# Patient Record
Sex: FEMALE | Race: WHITE | NOT HISPANIC OR LATINO | Employment: FULL TIME | URBAN - METROPOLITAN AREA
[De-identification: names, ages, dates, MRNs, and addresses within clinical notes are randomized per-mention and may not be internally consistent; named-entity substitution may affect disease eponyms.]

---

## 2017-07-27 PROBLEM — R87.612 LGSIL ON PAP SMEAR OF CERVIX: Status: ACTIVE | Noted: 2017-07-27

## 2017-07-27 PROBLEM — A74.9 CHLAMYDIA: Status: ACTIVE | Noted: 2017-07-27

## 2017-08-04 ENCOUNTER — HOSPITAL ENCOUNTER (EMERGENCY)
Facility: HOSPITAL | Age: 22
Discharge: HOME | End: 2017-08-04
Attending: EMERGENCY MEDICINE
Payer: MEDICAID

## 2017-08-04 VITALS
RESPIRATION RATE: 16 BRPM | DIASTOLIC BLOOD PRESSURE: 74 MMHG | HEIGHT: 63 IN | BODY MASS INDEX: 23.04 KG/M2 | WEIGHT: 130 LBS | TEMPERATURE: 98 F | SYSTOLIC BLOOD PRESSURE: 121 MMHG | HEART RATE: 80 BPM | OXYGEN SATURATION: 100 %

## 2017-08-04 DIAGNOSIS — R07.9 CHEST PAIN: ICD-10-CM

## 2017-08-04 DIAGNOSIS — O26.892 PELVIC PAIN AFFECTING PREGNANCY, SECOND TRIMESTER: ICD-10-CM

## 2017-08-04 DIAGNOSIS — R10.9 ABDOMINAL PAIN AFFECTING PREGNANCY: Primary | ICD-10-CM

## 2017-08-04 DIAGNOSIS — O26.892 BACK PAIN AFFECTING PREGNANCY, SECOND TRIMESTER: ICD-10-CM

## 2017-08-04 DIAGNOSIS — O26.899 ABDOMINAL PAIN AFFECTING PREGNANCY: Primary | ICD-10-CM

## 2017-08-04 LAB
ALBUMIN SERPL BCP-MCNC: 3.4 G/DL
ALP SERPL-CCNC: 48 U/L
ALT SERPL W/O P-5'-P-CCNC: 7 U/L
AMORPH CRY URNS QL MICRO: NORMAL
ANION GAP SERPL CALC-SCNC: 7 MMOL/L
AST SERPL-CCNC: 10 U/L
BACTERIA #/AREA URNS HPF: NORMAL /HPF
BACTERIA GENITAL QL WET PREP: ABNORMAL
BASOPHILS # BLD AUTO: 0.01 K/UL
BASOPHILS NFR BLD: 0.2 %
BILIRUB SERPL-MCNC: 0.4 MG/DL
BILIRUB UR QL STRIP: NEGATIVE
BUN SERPL-MCNC: 8 MG/DL
CALCIUM SERPL-MCNC: 9.4 MG/DL
CHLORIDE SERPL-SCNC: 106 MMOL/L
CLARITY UR: CLEAR
CLUE CELLS VAG QL WET PREP: ABNORMAL
CO2 SERPL-SCNC: 26 MMOL/L
COLOR UR: YELLOW
CREAT SERPL-MCNC: 0.6 MG/DL
DIFFERENTIAL METHOD: ABNORMAL
EOSINOPHIL # BLD AUTO: 0.1 K/UL
EOSINOPHIL NFR BLD: 1.6 %
ERYTHROCYTE [DISTWIDTH] IN BLOOD BY AUTOMATED COUNT: 13.4 %
EST. GFR  (AFRICAN AMERICAN): >60 ML/MIN/1.73 M^2
EST. GFR  (NON AFRICAN AMERICAN): >60 ML/MIN/1.73 M^2
FILAMENT FUNGI VAG WET PREP-#/AREA: ABNORMAL
GLUCOSE SERPL-MCNC: 88 MG/DL
GLUCOSE UR QL STRIP: NEGATIVE
HCG INTACT+B SERPL-ACNC: NORMAL MIU/ML
HCT VFR BLD AUTO: 34.7 %
HGB BLD-MCNC: 12.1 G/DL
HGB UR QL STRIP: ABNORMAL
KETONES UR QL STRIP: NEGATIVE
LEUKOCYTE ESTERASE UR QL STRIP: NEGATIVE
LYMPHOCYTES # BLD AUTO: 1.6 K/UL
LYMPHOCYTES NFR BLD: 25.4 %
MCH RBC QN AUTO: 28.8 PG
MCHC RBC AUTO-ENTMCNC: 34.9 G/DL
MCV RBC AUTO: 83 FL
MICROSCOPIC COMMENT: NORMAL
MONOCYTES # BLD AUTO: 0.3 K/UL
MONOCYTES NFR BLD: 5.3 %
NEUTROPHILS # BLD AUTO: 4.3 K/UL
NEUTROPHILS NFR BLD: 67.5 %
NITRITE UR QL STRIP: NEGATIVE
PH UR STRIP: 6 [PH] (ref 5–8)
PLATELET # BLD AUTO: 208 K/UL
PMV BLD AUTO: 10.5 FL
POTASSIUM SERPL-SCNC: 4.3 MMOL/L
PROT SERPL-MCNC: 6.9 G/DL
PROT UR QL STRIP: NEGATIVE
RBC # BLD AUTO: 4.2 M/UL
RBC #/AREA URNS HPF: 2 /HPF (ref 0–4)
SODIUM SERPL-SCNC: 139 MMOL/L
SP GR UR STRIP: 1.02 (ref 1–1.03)
SPECIMEN SOURCE: ABNORMAL
SQUAMOUS #/AREA URNS HPF: 4 /HPF
T VAGINALIS GENITAL QL WET PREP: ABNORMAL
URN SPEC COLLECT METH UR: ABNORMAL
UROBILINOGEN UR STRIP-ACNC: NEGATIVE EU/DL
WBC # BLD AUTO: 6.41 K/UL
WBC #/AREA URNS HPF: 1 /HPF (ref 0–5)
WBC #/AREA VAG WET PREP: ABNORMAL
YEAST GENITAL QL WET PREP: ABNORMAL

## 2017-08-04 PROCEDURE — 85025 COMPLETE CBC W/AUTO DIFF WBC: CPT

## 2017-08-04 PROCEDURE — 81000 URINALYSIS NONAUTO W/SCOPE: CPT

## 2017-08-04 PROCEDURE — 80053 COMPREHEN METABOLIC PANEL: CPT

## 2017-08-04 PROCEDURE — 25000003 PHARM REV CODE 250: Performed by: PHYSICIAN ASSISTANT

## 2017-08-04 PROCEDURE — 87210 SMEAR WET MOUNT SALINE/INK: CPT

## 2017-08-04 PROCEDURE — 84702 CHORIONIC GONADOTROPIN TEST: CPT

## 2017-08-04 PROCEDURE — 87591 N.GONORRHOEAE DNA AMP PROB: CPT

## 2017-08-04 PROCEDURE — 93005 ELECTROCARDIOGRAM TRACING: CPT

## 2017-08-04 PROCEDURE — 99284 EMERGENCY DEPT VISIT MOD MDM: CPT

## 2017-08-04 RX ORDER — ACETAMINOPHEN 500 MG
500 TABLET ORAL
Status: COMPLETED | OUTPATIENT
Start: 2017-08-04 | End: 2017-08-04

## 2017-08-04 RX ADMIN — ACETAMINOPHEN 500 MG: 500 TABLET ORAL at 01:08

## 2017-08-04 NOTE — DISCHARGE INSTRUCTIONS
You can take over-the-counter Tylenol as needed for your pain.  Your abdominal pain could possibly be normal due to ligament stretching, a stomach virus, or early appendicitis. Anytime there is abdominal pain, pelvic pain or vaginal bleeding in pregnancy, miscarriage is also a possibility.    Please make appointment to follow up with your OBGYN in 2 days.     Return to ER if your pain moves to the right lower part of your abdomen, if you develop worsening symptoms, loss of appetite, fever, vaginal bleeding, worsening pain or as needed.

## 2017-08-04 NOTE — ED PROVIDER NOTES
"Encounter Date: 2017    SCRIBE #1 NOTE: I, Elbayasmani Avila, am scribing for, and in the presence of,  Rosaura Harrison PA-C. I have scribed the following portions of the note - Other sections scribed: HPI and ROS.       History     Chief Complaint   Patient presents with    Abdominal Pain     17 week gestation.  "labor pains since this am." denies trauma or bleeding.  all previous (x3) have been full term.     CC: Abdominal Pain    HPI: This A0 approximately 17 weeks pregnant 21 y/o femalepresents to the ED c/o constant generalized abdominal pain, pelvic pain and lumbar back pain that began this morning. Pt describes the symptoms as "really bad labor pains", noting the pain as constant and severe (10/10). She reports that she had one episode of diarrhea (1x episode last night). Pt is also c/o 3 episodes of intermittent CP, SOB, dizziness and lightheadedness since onset of pain. Pt reports that she was last evaluated by her OBGYN, Dr. Roberto Salamanca, where she had an ultrasound preformed and informed by the ultrasound tech that the "fetus was smaller than it should be". She denies vaginal bleeding, nausea, emesis, dysuria, fever, anorexia, leg swelling or pain and any recent sick contacts. No other associated symptoms. No treatment attempted PTA to the ED. No alleviating factors or exacerbating factors.          The history is provided by the patient. No  was used.     Review of patient's allergies indicates:   Allergen Reactions    Iodine and iodide containing products Hives    Latex, natural rubber Rash     Past Medical History:   Diagnosis Date    Asthma     Depression     Pt has Hx of depression and self mutilation (cutting)    Disorder of kidney and ureter      History reviewed. No pertinent surgical history.  Family History   Problem Relation Age of Onset    Lupus Mother      Social History   Substance Use Topics    Smoking status: Never Smoker    Smokeless tobacco: Not on file "    Alcohol use No      Comment: not currently. socially     Review of Systems   Constitutional: Negative for appetite change and fever.   HENT: Negative for sore throat.    Respiratory: Positive for shortness of breath.    Cardiovascular: Positive for chest pain. Negative for leg swelling.   Gastrointestinal: Positive for abdominal pain (generalized; radiating to the back) and diarrhea (1x episode last night). Negative for nausea and vomiting.   Genitourinary: Positive for frequency and pelvic pain. Negative for dysuria and vaginal bleeding.   Musculoskeletal: Positive for back pain (generalized).   Skin: Negative for rash.   Neurological: Positive for dizziness and light-headedness. Negative for weakness.       Physical Exam     Initial Vitals [08/04/17 1233]   BP Pulse Resp Temp SpO2   121/74 80 16 97.6 °F (36.4 °C) 100 %      MAP       89.67         Physical Exam    Nursing note and vitals reviewed.  Constitutional: She appears well-developed and well-nourished. No distress.   HENT:   Head: Normocephalic.   Right Ear: Hearing, tympanic membrane, external ear and ear canal normal.   Left Ear: Hearing, tympanic membrane, external ear and ear canal normal.   Nose: Nose normal. No mucosal edema. Right sinus exhibits no maxillary sinus tenderness and no frontal sinus tenderness. Left sinus exhibits no maxillary sinus tenderness and no frontal sinus tenderness.   Mouth/Throat: Uvula is midline, oropharynx is clear and moist and mucous membranes are normal. No oropharyngeal exudate, posterior oropharyngeal edema or posterior oropharyngeal erythema.   Eyes: Conjunctivae are normal.   Cardiovascular: Normal rate and regular rhythm. Exam reveals no gallop and no friction rub.    No murmur heard.  Pulmonary/Chest: Effort normal and breath sounds normal. She has no decreased breath sounds. She has no wheezes. She has no rhonchi. She has no rales. She exhibits no tenderness.   Abdominal: Soft. Bowel sounds are normal. She  "exhibits no distension. There is generalized tenderness. There is no rigidity, no rebound, no guarding, no CVA tenderness, no tenderness at McBurney's point and negative Ledesma's sign.   Genitourinary: Guaiac stool: thick white vaginal discharge. Guaiac stool: thick white vaginal discharge. Vaginal discharge found.   Genitourinary Comments: Moderate amount of thick white discharge. No bleeding. Right adnexal TTP or adnexal masses. No CMT. Cervical os closed.   Musculoskeletal: Normal range of motion.   No TTP of spine or paraspinal musculature.    Lymphadenopathy:     She has no cervical adenopathy.   Neurological: She is alert. She has normal strength. No cranial nerve deficit or sensory deficit.   Skin: Skin is warm and dry. Capillary refill takes less than 2 seconds.   Psychiatric: She has a normal mood and affect.         ED Course   Procedures  Labs Reviewed   CBC W/ AUTO DIFFERENTIAL - Abnormal; Notable for the following:        Result Value    Hematocrit 34.7 (*)     All other components within normal limits   COMPREHENSIVE METABOLIC PANEL - Abnormal; Notable for the following:     Albumin 3.4 (*)     Alkaline Phosphatase 48 (*)     ALT 7 (*)     Anion Gap 7 (*)     All other components within normal limits   VAGINAL SCREEN - Abnormal; Notable for the following:     Clue Cells, Wet Prep Few (*)     WBC - Vaginal Screen Few (*)     Bacteria - Vaginal Screen Few (*)     All other components within normal limits   URINALYSIS - Abnormal; Notable for the following:     Occult Blood UA 1+ (*)     All other components within normal limits   C. TRACHOMATIS/N. GONORRHOEAE BY AMP DNA   HCG, QUANTITATIVE, PREGNANCY   URINALYSIS MICROSCOPIC             Medical Decision Making:   Initial Assessment:   Patient is a  22-year-old approximately 17 weeks gravid (based on ultrasound) female presents for evaluation of constant denies abdominal pain, pelvic pain and lower back pain that "feels like labor pain."  since this " morning.  Patient denies vaginal bleeding, loss of appetite, fevers, NV.  Patient is afebrile, well-appearing in acute distress.  On exam, there is generalized tenderness to palpation of the abdomen.  No CVA tenderness or TTP to the back. . Pelvic exam remarkable for moderate amount of thick white discharge and right adnexal TTP.  No CMT. No bleeding. Cervical os closed. Patient was recently treated for chlamydia by OB/GYN and has been reports he was also treated and they have not had sexual intercourse.  Patient given Tylenol in the ED.  Repeat abdominal exam with TTP to the lower abdomen with no peritoneal signs.  CBC and CMP unremarkable.  HCG 19,000.  Vaginal screen remarkable for clue cells.  UA without evidence of infection-doubt UTI or pyelonephritis.  Patient discharged home with instructions to take Tylenol as needed for pain, pelvic rest. This may be second trimester changes, miscarriage, early appendcitis.  OB/GYN follow-up in 2 days.  Strict ER return precautions discussed with the patient including worsening pain, pain with less of RLQ, anorexia, fever, nausea, vomiting or as needed.  I discussed this patient with Dr. Medina who also evaluated the pt face to face and she agrees with assessment and plan.             Scribe Attestation:   Scribe #1: I performed the above scribed service and the documentation accurately describes the services I performed. I attest to the accuracy of the note.    Attending Attestation:           Physician Attestation for Scribe:  Physician Attestation Statement for Scribe #1: I, Rosaura Harrison PA-C, reviewed documentation, as scribed by Elba Avila in my presence, and it is both accurate and complete.                 ED Course     Clinical Impression:   The primary encounter diagnosis was Abdominal pain affecting pregnancy. Diagnoses of Chest pain, Back pain affecting pregnancy, second trimester, and Pelvic pain affecting pregnancy, second trimester were also  pertinent to this visit.                           Rosaura Harrison PA-C  08/05/17 0155

## 2017-08-04 NOTE — ED TRIAGE NOTES
Patient comes to the ER with  Abdominal discomfort. Patient states pain radiates to back. Patient had diarrhea this morning. OBGYN is Dr. Salamanca. Patient states feels like labor pains. Patient is 18 weeks pregnant.

## 2017-08-06 LAB
C TRACH DNA SPEC QL NAA+PROBE: DETECTED
N GONORRHOEA DNA SPEC QL NAA+PROBE: NOT DETECTED

## 2017-10-17 ENCOUNTER — HOSPITAL ENCOUNTER (OUTPATIENT)
Dept: OBSTETRICS AND GYNECOLOGY | Facility: HOSPITAL | Age: 22
Discharge: HOME | End: 2017-10-17
Attending: OBSTETRICS & GYNECOLOGY
Payer: MEDICAID

## 2017-10-17 DIAGNOSIS — Z34.90 PREGNANCY: ICD-10-CM

## 2017-10-17 LAB — INJECT RH IG VOL PATIENT: NORMAL ML

## 2017-10-17 PROCEDURE — 63600519 RHOGAM PHARM REV CODE 636 ALT 250 W HCPCS: Performed by: OBSTETRICS & GYNECOLOGY

## 2017-10-17 PROCEDURE — 96372 THER/PROPH/DIAG INJ SC/IM: CPT

## 2017-10-17 RX ADMIN — HUMAN RHO(D) IMMUNE GLOBULIN 300 MCG: 300 INJECTION, SOLUTION INTRAMUSCULAR at 03:10

## 2017-12-13 PROBLEM — O99.820 GBS (GROUP B STREPTOCOCCUS CARRIER), +RV CULTURE, CURRENTLY PREGNANT: Status: ACTIVE | Noted: 2017-12-13

## 2017-12-27 ENCOUNTER — HOSPITAL ENCOUNTER (OUTPATIENT)
Facility: HOSPITAL | Age: 22
Discharge: HOME | End: 2017-12-27
Attending: OBSTETRICS & GYNECOLOGY | Admitting: OBSTETRICS & GYNECOLOGY
Payer: MEDICAID

## 2017-12-27 VITALS
TEMPERATURE: 98 F | HEART RATE: 67 BPM | SYSTOLIC BLOOD PRESSURE: 107 MMHG | BODY MASS INDEX: 28 KG/M2 | HEIGHT: 63 IN | DIASTOLIC BLOOD PRESSURE: 60 MMHG | RESPIRATION RATE: 16 BRPM | WEIGHT: 158 LBS

## 2017-12-27 DIAGNOSIS — Z34.90 PREGNANCY WITH ONE FETUS: ICD-10-CM

## 2017-12-27 PROCEDURE — 99211 OFF/OP EST MAY X REQ PHY/QHP: CPT

## 2017-12-27 RX ORDER — ACETAMINOPHEN 500 MG
500 TABLET ORAL EVERY 6 HOURS PRN
Status: DISCONTINUED | OUTPATIENT
Start: 2017-12-27 | End: 2017-12-28 | Stop reason: HOSPADM

## 2017-12-27 RX ORDER — ONDANSETRON 8 MG/1
8 TABLET, ORALLY DISINTEGRATING ORAL EVERY 8 HOURS PRN
Status: DISCONTINUED | OUTPATIENT
Start: 2017-12-27 | End: 2017-12-28 | Stop reason: HOSPADM

## 2017-12-28 NOTE — DISCHARGE INSTRUCTIONS
Home Undelivered Discharge Instructions    After Discharge Orders:    Future Appointments  Date Time Provider Department Center   1/2/2018 10:40 AM Goyo Pat MD [facility]           Current Discharge Medication List      CONTINUE these medications which have NOT CHANGED    Details   ibuprofen (ADVIL,MOTRIN) 600 MG tablet Take 1 tablet (600 mg total) by mouth every 6 (six) hours.  Qty: 30 tablet, Refills: 0      norgestimate-ethinyl estradiol (ORTHO TRI-CYCLEN,TRI-SPRINTEC) 0.18/0.215/0.25 mg-35 mcg (28) tablet Take 1 tablet by mouth once daily.  Qty: 28 tablet, Refills: 11      oxycodone-acetaminophen (PERCOCET) 5-325 mg per tablet Take 2 tablets by mouth every 4 (four) hours as needed.  Qty: 20 tablet, Refills: 0      PNV62-FA-om3-dha-epa-fish oil (PRENATAL GUMMY) 400 mcg-35 mg -25 mg-5 mg Chew Take by mouth.                     · Diet:  normal diet as tolerated    · Rest: normal activity as tolerated    Other instructions: Do kick counts once a day on your baby. Choose the time of day your baby is most active. Get in a comfortable lying or sitting position and time how long it takes to feel 10 kicks, twists, turns, swishes, or rolls. Call your physician or midwife if there have not been 10 kicks in 1 hours    Call physician or midwife, return to Labor and Delivery, call 911, or go to the nearest Emergency Room if: increased leakage or fluid, contractions more than  6 per  15 minutes, decreased fetal movement, persistent low back pain or cramping, bleeding from vaginal area, persistent headache or vision change

## 2017-12-28 NOTE — TREATMENT PLAN
"Pt arrived with c/o loosing her mucus plug and having some ctx;states she was seen in the office yesterday and was told she was 3-4 cm and she could "go anytime";denies vag bleeding and leaking of fluid;abd soft and nontender to palp;+fm noted;poc discussed; at bedside  "

## 2017-12-31 ENCOUNTER — HOSPITAL ENCOUNTER (OUTPATIENT)
Facility: HOSPITAL | Age: 22
Discharge: HOME | End: 2017-12-31
Attending: OBSTETRICS & GYNECOLOGY | Admitting: OBSTETRICS & GYNECOLOGY
Payer: MEDICAID

## 2017-12-31 VITALS — SYSTOLIC BLOOD PRESSURE: 112 MMHG | HEART RATE: 81 BPM | DIASTOLIC BLOOD PRESSURE: 72 MMHG

## 2017-12-31 PROCEDURE — 99211 OFF/OP EST MAY X REQ PHY/QHP: CPT

## 2017-12-31 NOTE — DISCHARGE INSTRUCTIONS
Home Undelivered Discharge Instructions    After Discharge Orders:    Future Appointments  Date Time Provider Department Center   1/2/2018 10:40 AM Goyo Pat MD [facility]       Call physician office with any questions.    Current Discharge Medication List      CONTINUE these medications which have NOT CHANGED    Details   ibuprofen (ADVIL,MOTRIN) 600 MG tablet Take 1 tablet (600 mg total) by mouth every 6 (six) hours.  Qty: 30 tablet, Refills: 0      norgestimate-ethinyl estradiol (ORTHO TRI-CYCLEN,TRI-SPRINTEC) 0.18/0.215/0.25 mg-35 mcg (28) tablet Take 1 tablet by mouth once daily.  Qty: 28 tablet, Refills: 11      oxycodone-acetaminophen (PERCOCET) 5-325 mg per tablet Take 2 tablets by mouth every 4 (four) hours as needed.  Qty: 20 tablet, Refills: 0      PNV62-FA-om3-dha-epa-fish oil (PRENATAL GUMMY) 400 mcg-35 mg -25 mg-5 mg Chew Take by mouth.                     · Diet:  normal diet as tolerated    · Rest: normal activity as tolerated    Other instructions: Do kick counts once a day on your baby. Choose the time of day your baby is most active. Get in a comfortable lying or sitting position and time how long it takes to feel 10 kicks, twists, turns, swishes, or rolls. Call your physician or midwife if there have not been 10 kicks in 1 hours    Call physician or midwife, return to Labor and Delivery, call 911, or go to the nearest Emergency Room if: increased leakage or fluid, contractions more than  5 per  15 minutes, decreased fetal movement, persistent low back pain or cramping, bleeding from vaginal area or difficulty urinating

## 2018-01-02 ENCOUNTER — ANESTHESIA EVENT (OUTPATIENT)
Dept: OBSTETRICS AND GYNECOLOGY | Facility: HOSPITAL | Age: 23
End: 2018-01-02
Payer: MEDICAID

## 2018-01-02 ENCOUNTER — ANESTHESIA (OUTPATIENT)
Dept: OBSTETRICS AND GYNECOLOGY | Facility: HOSPITAL | Age: 23
End: 2018-01-02
Payer: MEDICAID

## 2018-01-02 ENCOUNTER — HOSPITAL ENCOUNTER (INPATIENT)
Facility: HOSPITAL | Age: 23
LOS: 2 days | Discharge: HOME | End: 2018-01-04
Attending: OBSTETRICS & GYNECOLOGY | Admitting: OBSTETRICS & GYNECOLOGY
Payer: MEDICAID

## 2018-01-02 DIAGNOSIS — Z34.03 ENCOUNTER FOR SUPERVISION OF NORMAL FIRST PREGNANCY IN THIRD TRIMESTER: ICD-10-CM

## 2018-01-02 LAB
ABO + RH BLD: NORMAL
BASOPHILS # BLD AUTO: 0.01 K/UL
BASOPHILS NFR BLD: 0.1 %
BLD GP AB SCN CELLS X3 SERPL QL: NORMAL
DIFFERENTIAL METHOD: ABNORMAL
EOSINOPHIL # BLD AUTO: 0.1 K/UL
EOSINOPHIL NFR BLD: 1.4 %
ERYTHROCYTE [DISTWIDTH] IN BLOOD BY AUTOMATED COUNT: 12.9 %
HCT VFR BLD AUTO: 35.9 %
HGB BLD-MCNC: 11.8 G/DL
LYMPHOCYTES # BLD AUTO: 1.2 K/UL
LYMPHOCYTES NFR BLD: 15.1 %
MCH RBC QN AUTO: 27.1 PG
MCHC RBC AUTO-ENTMCNC: 32.9 G/DL
MCV RBC AUTO: 82 FL
MONOCYTES # BLD AUTO: 0.6 K/UL
MONOCYTES NFR BLD: 8.1 %
NEUTROPHILS # BLD AUTO: 5.8 K/UL
NEUTROPHILS NFR BLD: 74.5 %
PLATELET # BLD AUTO: 202 K/UL
PMV BLD AUTO: 11 FL
RBC # BLD AUTO: 4.36 M/UL
WBC # BLD AUTO: 7.76 K/UL

## 2018-01-02 PROCEDURE — 85025 COMPLETE CBC W/AUTO DIFF WBC: CPT

## 2018-01-02 PROCEDURE — 27200710 HC EPIDURAL INFUSION PUMP SET: Performed by: ANESTHESIOLOGY

## 2018-01-02 PROCEDURE — 63600175 PHARM REV CODE 636 W HCPCS: Performed by: OBSTETRICS & GYNECOLOGY

## 2018-01-02 PROCEDURE — 86592 SYPHILIS TEST NON-TREP QUAL: CPT

## 2018-01-02 PROCEDURE — 25000003 PHARM REV CODE 250: Performed by: OBSTETRICS & GYNECOLOGY

## 2018-01-02 PROCEDURE — 36415 COLL VENOUS BLD VENIPUNCTURE: CPT

## 2018-01-02 PROCEDURE — 27800517 HC TRAY,EPIDURAL-CONTINUOUS: Performed by: ANESTHESIOLOGY

## 2018-01-02 PROCEDURE — 85461 HEMOGLOBIN FETAL: CPT

## 2018-01-02 PROCEDURE — 86850 RBC ANTIBODY SCREEN: CPT

## 2018-01-02 PROCEDURE — 72200005 HC VAGINAL DELIVERY LEVEL II

## 2018-01-02 PROCEDURE — 59409 OBSTETRICAL CARE: CPT | Mod: AA,,, | Performed by: ANESTHESIOLOGY

## 2018-01-02 PROCEDURE — 99211 OFF/OP EST MAY X REQ PHY/QHP: CPT | Mod: 25

## 2018-01-02 PROCEDURE — 11000001 HC ACUTE MED/SURG PRIVATE ROOM

## 2018-01-02 PROCEDURE — 62326 NJX INTERLAMINAR LMBR/SAC: CPT | Performed by: ANESTHESIOLOGY

## 2018-01-02 PROCEDURE — 25000003 PHARM REV CODE 250: Performed by: ANESTHESIOLOGY

## 2018-01-02 PROCEDURE — 51702 INSERT TEMP BLADDER CATH: CPT

## 2018-01-02 PROCEDURE — 72100002 HC LABOR CARE, 1ST 8 HOURS

## 2018-01-02 RX ORDER — HYDROCODONE BITARTRATE AND ACETAMINOPHEN 10; 325 MG/1; MG/1
1 TABLET ORAL EVERY 4 HOURS PRN
Status: DISCONTINUED | OUTPATIENT
Start: 2018-01-02 | End: 2018-01-04 | Stop reason: HOSPADM

## 2018-01-02 RX ORDER — ACETAMINOPHEN 325 MG/1
650 TABLET ORAL EVERY 6 HOURS PRN
Status: DISCONTINUED | OUTPATIENT
Start: 2018-01-02 | End: 2018-01-04 | Stop reason: HOSPADM

## 2018-01-02 RX ORDER — OXYTOCIN/RINGER'S LACTATE 20/1000 ML
2 PLASTIC BAG, INJECTION (ML) INTRAVENOUS CONTINUOUS
Status: DISCONTINUED | OUTPATIENT
Start: 2018-01-02 | End: 2018-01-02

## 2018-01-02 RX ORDER — ONDANSETRON 8 MG/1
8 TABLET, ORALLY DISINTEGRATING ORAL EVERY 8 HOURS PRN
Status: DISCONTINUED | OUTPATIENT
Start: 2018-01-02 | End: 2018-01-02

## 2018-01-02 RX ORDER — BUPIVACAINE HYDROCHLORIDE 2.5 MG/ML
INJECTION, SOLUTION EPIDURAL; INFILTRATION; INTRACAUDAL
Status: DISCONTINUED | OUTPATIENT
Start: 2018-01-02 | End: 2018-01-05

## 2018-01-02 RX ORDER — IBUPROFEN 600 MG/1
600 TABLET ORAL EVERY 6 HOURS
Status: DISCONTINUED | OUTPATIENT
Start: 2018-01-02 | End: 2018-01-04 | Stop reason: HOSPADM

## 2018-01-02 RX ORDER — HYDROCODONE BITARTRATE AND ACETAMINOPHEN 5; 325 MG/1; MG/1
1 TABLET ORAL EVERY 4 HOURS PRN
Status: DISCONTINUED | OUTPATIENT
Start: 2018-01-02 | End: 2018-01-04 | Stop reason: HOSPADM

## 2018-01-02 RX ORDER — SODIUM CHLORIDE, SODIUM LACTATE, POTASSIUM CHLORIDE, CALCIUM CHLORIDE 600; 310; 30; 20 MG/100ML; MG/100ML; MG/100ML; MG/100ML
INJECTION, SOLUTION INTRAVENOUS CONTINUOUS
Status: DISCONTINUED | OUTPATIENT
Start: 2018-01-02 | End: 2018-01-02

## 2018-01-02 RX ORDER — FENTANYL/BUPIVACAINE/NS/PF 2MCG/ML-.1
PLASTIC BAG, INJECTION (ML) INJECTION CONTINUOUS PRN
Status: DISCONTINUED | OUTPATIENT
Start: 2018-01-02 | End: 2018-01-05

## 2018-01-02 RX ORDER — SODIUM CHLORIDE 9 MG/ML
INJECTION, SOLUTION INTRAVENOUS
Status: DISCONTINUED | OUTPATIENT
Start: 2018-01-02 | End: 2018-01-02

## 2018-01-02 RX ORDER — OXYTOCIN/RINGER'S LACTATE 20/1000 ML
41.65 PLASTIC BAG, INJECTION (ML) INTRAVENOUS CONTINUOUS
Status: DISPENSED | OUTPATIENT
Start: 2018-01-02 | End: 2018-01-03

## 2018-01-02 RX ORDER — HYDROCORTISONE 25 MG/G
CREAM TOPICAL 3 TIMES DAILY PRN
Status: DISCONTINUED | OUTPATIENT
Start: 2018-01-02 | End: 2018-01-04 | Stop reason: HOSPADM

## 2018-01-02 RX ORDER — DIPHENHYDRAMINE HYDROCHLORIDE 50 MG/ML
25 INJECTION INTRAMUSCULAR; INTRAVENOUS EVERY 4 HOURS PRN
Status: DISCONTINUED | OUTPATIENT
Start: 2018-01-02 | End: 2018-01-04 | Stop reason: HOSPADM

## 2018-01-02 RX ORDER — MISOPROSTOL 200 UG/1
600 TABLET ORAL
Status: DISCONTINUED | OUTPATIENT
Start: 2018-01-02 | End: 2018-01-02

## 2018-01-02 RX ADMIN — Medication 2 MILLI-UNITS/MIN: at 02:01

## 2018-01-02 RX ADMIN — AMPICILLIN SODIUM 2 G: 2 INJECTION, POWDER, FOR SOLUTION INTRAMUSCULAR; INTRAVENOUS at 01:01

## 2018-01-02 RX ADMIN — SODIUM CHLORIDE, SODIUM LACTATE, POTASSIUM CHLORIDE, AND CALCIUM CHLORIDE: 600; 310; 30; 20 INJECTION, SOLUTION INTRAVENOUS at 01:01

## 2018-01-02 RX ADMIN — BUPIVACAINE HYDROCHLORIDE 0.8 ML: 2.5 INJECTION, SOLUTION EPIDURAL; INFILTRATION; INTRACAUDAL; PERINEURAL at 02:01

## 2018-01-02 RX ADMIN — IBUPROFEN 600 MG: 600 TABLET, FILM COATED ORAL at 11:01

## 2018-01-02 RX ADMIN — Medication 10 ML/HR: at 02:01

## 2018-01-02 RX ADMIN — Medication 41.65 MILLI-UNITS/MIN: at 05:01

## 2018-01-02 RX ADMIN — HYDROCODONE BITARTRATE AND ACETAMINOPHEN 1 TABLET: 5; 325 TABLET ORAL at 09:01

## 2018-01-02 RX ADMIN — SODIUM CHLORIDE, SODIUM LACTATE, POTASSIUM CHLORIDE, AND CALCIUM CHLORIDE: 600; 310; 30; 20 INJECTION, SOLUTION INTRAVENOUS at 03:01

## 2018-01-02 NOTE — LACTATION NOTE
This note was copied from a baby's chart.     18 0995   Infant Assessment   Sucking Reflex present   Rooting Reflex present   Swallow Reflex present   LATCH Score   Latch 2-->grasps breast, tongue down, lips flanged, rhythmic sucking   Audible Swallowing 2-->spontaneous and intermittent (24 hrs old)   Type Of Nipple 2-->everted (after stimulation)   Comfort (Breast/Nipple) 2-->soft/nontender   Hold (Positioning) 2-->no assist from staff, mother able to position/hold infant   Score (less than 7 for 2/more consecutive times, consult Lactation Consultant) 10   Maternal Infant Feeding   Maternal Emotional State independent;relaxed   Infant Positioning cradle   Signs of Milk Transfer audible swallow;infant jaw motion present   Presence of Pain no   Time Spent (min) 0-15 min   Latch Assistance no   Breastfeeding Education adequate infant intake;adequate milk volume;importance of skin-to-skin contact   Breastfeeding History   Currently Breastfeeding yes   Infant First Feeding   Skin-to-Skin Contact Maintained   Breastfeeding breastfeeding, left side only   Feeding Infant   Feeding Readiness Cues rooting   Feeding Tolerance/Success rooting;strong suck;coordinated suck;coordinated swallow   Effective Latch During Feeding yes   Suck/Swallow Coordination present   Lactation Referrals   Lactation Consult Breastfeeding assessment;Initial assessment    Breastfeeding   Sleep States ([place] [employer] [place]) active alert (eyes open/moderate activity/brief vocalizing)   Lactation Interventions   Attachment Promotion counseling provided;family involvement promoted;infant-mother separation minimized;privacy provided;role responsibility promoted;skin-to-skin contact encouraged;rooming-in promoted   Breastfeeding Assistance feeding cue recognition promoted;feeding on demand promoted;feeding session observed;infant latch-on verified;infant suck/swallow verified;support offered   Maternal Breastfeeding Support encouragement  offered;infant-mother separation minimized;lactation counseling provided   mother with baby skin to skin and at the breast -baby with strong sucking on and off and mother denies discomfort with feeding -states having  last baby for 3 months -review some basic breastfeeding information now and states understanding

## 2018-01-02 NOTE — ANESTHESIA PREPROCEDURE EVALUATION
01/02/2018  Rosaura Boyd is a 22 y.o., female.    Anesthesia Evaluation    I have reviewed the Patient Summary Reports.     I have reviewed the Medications.     Review of Systems  Anesthesia Hx:  No problems with previous Anesthesia Denies Hx of Anesthetic complications  History of prior surgery of interest to airway management or planning: Denies Family Hx of Anesthesia complications.   Denies Personal Hx of Anesthesia complications.   Social:  Non-Smoker, No Alcohol Use    Hematology/Oncology:  Hematology Normal   Oncology Normal     EENT/Dental:EENT/Dental Normal   Cardiovascular:  Cardiovascular Normal Exercise tolerance: good     Pulmonary:   Asthma mild    Renal/:   Chronic Renal Disease    Hepatic/GI:  Hepatic/GI Normal    Musculoskeletal:  Musculoskeletal Normal    Neurological:  Neurology Normal    Endocrine:  Endocrine Normal    Dermatological:  Skin Normal    Psych:   Psychiatric History          Physical Exam  General:  Well nourished    Airway/Jaw/Neck:  Airway Findings: Mouth Opening: Normal Tongue: Normal  General Airway Assessment: Adult  Mallampati: II  Improves to II with phonation.  TM Distance: 4 - 6 cm      Dental:  Dental Findings: In tact   Chest/Lungs:  Chest/Lungs Findings: Clear to auscultation, Normal Respiratory Rate     Heart/Vascular:  Heart Findings: Rate: Normal  Rhythm: Regular Rhythm        Mental Status:  Mental Status Findings:  Cooperative, Alert and Oriented         Anesthesia Plan  Type of Anesthesia, risks & benefits discussed:  Anesthesia Type:  CSE  Patient's Preference:   Intra-op Monitoring Plan:   Intra-op Monitoring Plan Comments:   Post Op Pain Control Plan:   Post Op Pain Control Plan Comments:   Induction:   IV  Beta Blocker:  Patient is not currently on a Beta-Blocker (No further documentation required).       Informed Consent: Patient understands risks  and agrees with Anesthesia plan.  Questions answered. Anesthesia consent signed with patient.  ASA Score: 2     Day of Surgery Review of History & Physical:    H&P update referred to the surgeon.         Ready For Surgery From Anesthesia Perspective.

## 2018-01-02 NOTE — H&P
`History and Physical  Obstetrics      SUBJECTIVE:     Rosaura Boyd is a 22 y.o.  female at 39w1d  admitted for labor management.  Her current obstetrical history is significant for preciptous deliveries .        (Not in a hospital admission)    Review of patient's allergies indicates:   Allergen Reactions    Iodine and iodide containing products Hives    Latex, natural rubber Rash        Past Medical History:   Diagnosis Date    Asthma     Depression     Pt has Hx of depression and self mutilation (cutting)    Disorder of kidney and ureter      No past surgical history on file.  Family History   Problem Relation Age of Onset    Lupus Mother      Social History   Substance Use Topics    Smoking status: Never Smoker    Smokeless tobacco: Never Used    Alcohol use No      Comment: not currently. socially        OBJECTIVE:     Vital Signs (Most Recent)  BP: 109/70 (18 1027)    Physical Exam:  General:  Normal, alert and oriented                       Abdomen: Soft gravid no FT   Uterine Size:  c/w dates   Presentations:  vertex   FHT: reviewed   Pelvis: NEFG   Cervix:     Dilation: 4 cm    Effacement: 75%    Station:  -2               Laboratory:  @LABRCNTIP(ABORH,HEPBSAG,RUBELLAIGGSC,GBS,AFP,RHJTBWO2FR)@   ASSESSMENT/PLAN:     39w1d gestation.  Active phase labor.   Conditions: GBBS Colonization- start PCN.

## 2018-01-02 NOTE — TREATMENT PLAN
"Pt presents to unit with c/o contractions. Pt states she was sent from the office by Dr. Pat due to her cervix being 4 cm dilated and that he "stripped her membranes". EFMx2 placed, positive FHT noted. SVE done, see charting. History and POC reviewed with pt, verbalizes understanding. PO hydration given to pt.   "

## 2018-01-02 NOTE — L&D DELIVERY NOTE
Surgeon kalyan    Date     Procedure:      Indications: 22 y.o.  with IUP at 39w1d admitted in labor.  Patient progressed to C/+2.    Findings:  female infant, vertex presentation, APGARS 9 at 1 minute, 9 at 5 minutes, weight is pending.    Procedure  Laceration :  No  Episiotomy:  No  Forceps: No  Vacuum: No    EBL: 300cc    Anesthesia epidural    Specimen:  Placenta sent No    Complications:  None    Patient was placed in dorsal lithotomy position.  Patient was prepped and draped in a sterile fashion.  Patient began to push.  The vertex crowned and delivered.  The mouth and nose were suctioned with the bulb.  The remaining infant delivered without difficulty. The cord was cut and clamped.  The infant was vigorous upon delivery.  The infant was handed to the nurse.  The placenta delivered spontaneously and intact.  The patient tolerated the procedure well.  Patient will be monitored in recovery.

## 2018-01-02 NOTE — ANESTHESIA PROCEDURE NOTES
CSE    Patient location during procedure: OB  Start time: 1/2/2018 1:57 PM  Timeout: 1/2/2018 1:57 PM  End time: 1/2/2018 2:03 PM  Staffing  Anesthesiologist: BERNABE BENDER  Performed: anesthesiologist   Preanesthetic Checklist  Completed: patient identified, site marked, surgical consent, pre-op evaluation, timeout performed, IV checked, risks and benefits discussed and monitors and equipment checked  CSE  Patient position: sitting  Prep: ChloraPrep and site prepped and draped  Patient monitoring: heart rate, continuous pulse ox and frequent blood pressure checks  Approach: midline  Spinal Needle  Needle type: pencil-tip   Needle gauge: 25 G  Needle length: 5 in  Epidural Needle  Injection technique: MARIA GUADALUPE air  Needle type: Tuohy   Needle gauge: 17 G  Needle length: 3.5 in  Needle insertion depth: 5 cm  Location: L4-5  Needle localization: anatomical landmarks  Catheter  Catheter type: Coherent Labs  Catheter size: 19 G  Catheter at skin depth: 10 cm  Test dose: lidocaine 1.5% with Epi 1-to-200,000  Additional Documentation: negative aspiration for CSF, negative aspiration for heme and negative test dose  Assessment  Intrathecal Medications:  Bolus administered: 0.8 mL of 0.25 bupivacaine

## 2018-01-02 NOTE — TREATMENT PLAN
Notified and spoke with Dr. Boston of pts SVE, ctx pattern, and FHT. Ordered to admit pt for labor and call MD when ready to deliver. Pt can get epidural when ready.

## 2018-01-03 LAB
ABO + RH BLD: NORMAL
BASOPHILS # BLD AUTO: 0.02 K/UL
BASOPHILS NFR BLD: 0.2 %
BLD GP AB SCN CELLS X3 SERPL QL: NORMAL
DIFFERENTIAL METHOD: ABNORMAL
EOSINOPHIL # BLD AUTO: 0.2 K/UL
EOSINOPHIL NFR BLD: 2.4 %
ERYTHROCYTE [DISTWIDTH] IN BLOOD BY AUTOMATED COUNT: 12.8 %
FETAL CELL SCN BLD QL ROSETTE: NORMAL
HCT VFR BLD AUTO: 32.3 %
HGB BLD-MCNC: 10.7 G/DL
INJECT RH IG VOL PATIENT: NORMAL ML
LYMPHOCYTES # BLD AUTO: 1.6 K/UL
LYMPHOCYTES NFR BLD: 19 %
MCH RBC QN AUTO: 27 PG
MCHC RBC AUTO-ENTMCNC: 33.1 G/DL
MCV RBC AUTO: 82 FL
MONOCYTES # BLD AUTO: 0.9 K/UL
MONOCYTES NFR BLD: 10.4 %
NEUTROPHILS # BLD AUTO: 5.6 K/UL
NEUTROPHILS NFR BLD: 68 %
PLATELET # BLD AUTO: 152 K/UL
PMV BLD AUTO: 11 FL
RBC # BLD AUTO: 3.96 M/UL
RPR SER QL: NORMAL
WBC # BLD AUTO: 8.2 K/UL

## 2018-01-03 PROCEDURE — 85025 COMPLETE CBC W/AUTO DIFF WBC: CPT

## 2018-01-03 PROCEDURE — 63600519 RHOGAM PHARM REV CODE 636 ALT 250 W HCPCS: Performed by: OBSTETRICS & GYNECOLOGY

## 2018-01-03 PROCEDURE — 25000003 PHARM REV CODE 250: Performed by: OBSTETRICS & GYNECOLOGY

## 2018-01-03 PROCEDURE — 11000001 HC ACUTE MED/SURG PRIVATE ROOM

## 2018-01-03 PROCEDURE — 85461 HEMOGLOBIN FETAL: CPT

## 2018-01-03 PROCEDURE — 86850 RBC ANTIBODY SCREEN: CPT

## 2018-01-03 PROCEDURE — 36415 COLL VENOUS BLD VENIPUNCTURE: CPT

## 2018-01-03 RX ADMIN — IBUPROFEN 600 MG: 600 TABLET, FILM COATED ORAL at 05:01

## 2018-01-03 RX ADMIN — HUMAN RHO(D) IMMUNE GLOBULIN 300 MCG: 300 INJECTION, SOLUTION INTRAMUSCULAR at 05:01

## 2018-01-03 RX ADMIN — HYDROCODONE BITARTRATE AND ACETAMINOPHEN 1 TABLET: 5; 325 TABLET ORAL at 09:01

## 2018-01-03 RX ADMIN — IBUPROFEN 600 MG: 600 TABLET, FILM COATED ORAL at 12:01

## 2018-01-03 NOTE — PROGRESS NOTES
Spoke with Peg in the lab who informed me that the rhogam workup needs to be reordered because there was a problem the last two attempts to order it.

## 2018-01-03 NOTE — PLAN OF CARE
Problem: Breastfeeding (Adult,Obstetrics,Pediatric)  Goal: Signs and Symptoms of Listed Potential Problems Will be Absent, Minimized or Managed (Breastfeeding)  Signs and symptoms of listed potential problems will be absent, minimized or managed by discharge/transition of care (reference Breastfeeding (Adult,Obstetrics,Pediatric) CPG).   Outcome: Ongoing (interventions implemented as appropriate)  Plan to breastfeed on demand at least 8 times in 24 hours. Mother states breastfeeding going well, denies nipple pain.

## 2018-01-03 NOTE — LACTATION NOTE
This note was copied from a baby's chart.     01/03/18 0730   LATCH Score   Latch 2-->grasps breast, tongue down, lips flanged, rhythmic sucking   Audible Swallowing 2-->spontaneous and intermittent (24 hrs old)   Type Of Nipple 2-->everted (after stimulation)   Comfort (Breast/Nipple) 2-->soft/nontender   Hold (Positioning) 2-->no assist from staff, mother able to position/hold infant   Score (less than 7 for 2/more consecutive times, consult Lactation Consultant) 10   Maternal Infant Feeding   Infant Positioning side-lying;cradle   Signs of Milk Transfer audible swallow;infant jaw motion present   Breastfeeding Education adequate infant intake;importance of skin-to-skin contact;adequate milk volume   Infant First Feeding   Breastfeeding breastfeeding, bilateral   Breastfeeding Left Side (min) 5 Min  (continues to nurse)   Breastfeeding Right Side (min) 13 Min   Feeding Infant   Feeding Tolerance/Success alert for feeding;adequate pause for breath;eager;strong suck;coordinated suck;coordinated swallow   Effective Latch During Feeding yes   Suck/Swallow Coordination present   Lactation Interventions   Breastfeeding Assistance feeding cue recognition promoted;feeding on demand promoted;feeding session observed;infant latch-on verified;infant suck/swallow verified;support offered   Maternal Breastfeeding Support diary/feeding log utilized;encouragement offered;infant-mother separation minimized;lactation counseling provided;maternal hydration promoted;maternal nutrition promoted;maternal rest encouraged   Mother breastfeeding comfortably in side lying cradle position on left breast. No complaints of nipple discomfort. Mother encouraged to call for lactation assistance as needed.

## 2018-01-03 NOTE — PLAN OF CARE
Problem: Patient Care Overview  Goal: Plan of Care Review  Outcome: Ongoing (interventions implemented as appropriate)  VSS,, breastfeeding on demand, encouraged 8 times in 24 hours, wearing supportive bra. Fundus and lochia WDL. Voiding, passing flatus, ambulating and tolerating regular diet well. Bonding well with baby, Pain being managed with motrin and Vicodin as needed. Stated an understanding to POC. AM labs to be drawn. Pt Rhogam candidate

## 2018-01-03 NOTE — PROGRESS NOTES
No complaints.  Bleeding and pain are controlled.     Vital Signs (Most Recent):  Temp: 96.2 °F (35.7 °C) (01/03/18 0801)  Pulse: 76 (01/03/18 0801)  Resp: 18 (01/03/18 0801)  BP: (!) 97/55 (01/03/18 0801)  SpO2: (!) 93 % (01/02/18 1912)    Vital Signs Range (Last 24H):  Temp:  [96.2 °F (35.7 °C)-98.8 °F (37.1 °C)]   Pulse:  []   Resp:  [18]   BP: ()/(52-71)   SpO2:  [88 %-100 %]     Gen - NAD  Uterus - firm, non-tender        Recent Labs  Lab 01/03/18  0721   WBC 8.20   HGB 10.7*   HCT 32.3*          A/P PPD #1  Routine care

## 2018-01-04 VITALS
RESPIRATION RATE: 17 BRPM | BODY MASS INDEX: 27.64 KG/M2 | WEIGHT: 156 LBS | TEMPERATURE: 99 F | SYSTOLIC BLOOD PRESSURE: 98 MMHG | HEIGHT: 63 IN | DIASTOLIC BLOOD PRESSURE: 54 MMHG | HEART RATE: 69 BPM | OXYGEN SATURATION: 93 %

## 2018-01-04 PROCEDURE — 25000003 PHARM REV CODE 250: Performed by: OBSTETRICS & GYNECOLOGY

## 2018-01-04 RX ORDER — IBUPROFEN 600 MG/1
600 TABLET ORAL EVERY 6 HOURS PRN
Qty: 60 TABLET | Refills: 2 | Status: SHIPPED | OUTPATIENT
Start: 2018-01-04 | End: 2018-03-12

## 2018-01-04 RX ADMIN — IBUPROFEN 600 MG: 600 TABLET, FILM COATED ORAL at 05:01

## 2018-01-04 RX ADMIN — IBUPROFEN 600 MG: 600 TABLET, FILM COATED ORAL at 12:01

## 2018-01-04 NOTE — PLAN OF CARE
Problem: Patient Care Overview  Goal: Plan of Care Review  Outcome: Outcome(s) achieved Date Met: 01/04/18  Tolerating regular diet.  Voiding and passing gas.  Verbalizes pain control with prn pain medication.  Verbalized understanding of plan of care and care of self after discharge.

## 2018-01-04 NOTE — NURSING
Patient lying in bed breastfeeding baby. Encouraged pt to call nurse for assessment when finished nursing, stated an understanding. Denied c/o or needs at this time. Call light within reach.

## 2018-01-04 NOTE — LACTATION NOTE
"This note was copied from a baby's chart.     01/04/18 1000   Maternal Infant Assessment   Breast Density Bilateral:;full   Areola Bilateral:;elastic   Nipple(s) Bilateral:;everted   Infant Assessment   Sucking Reflex present   Rooting Reflex present   Swallow Reflex present   LATCH Score   Latch 2-->grasps breast, tongue down, lips flanged, rhythmic sucking   Audible Swallowing 2-->spontaneous and intermittent (24 hrs old)   Type Of Nipple 2-->everted (after stimulation)   Comfort (Breast/Nipple) 1-->filling, red/small blisters/bruises, mild/mod discomfort   Hold (Positioning) 2-->no assist from staff, mother able to position/hold infant   Score (less than 7 for 2/more consecutive times, consult Lactation Consultant) 9   Maternal Infant Feeding   Maternal Emotional State relaxed;independent   Time Spent (min) 15-30 min   Infant First Feeding   Breastfeeding Left Side (min) 30 Min   Breastfeeding Right Side (min) 30 Min   Lactation Referrals   Lactation Consult Follow up;Knowledge deficit   Lactation Interventions   Maternal Breastfeeding Support encouragement offered;lactation counseling provided   Reports breastfeeding well without complications, breasts full, not hard.  Breastfeeding discharge instructions given with review of Mother's Breastfeeding Guide and Resource List.  Encouraged to call hotline # prn.  States "understand" and verbalized appropriate recall.    "

## 2018-01-04 NOTE — PLAN OF CARE
Problem: Patient Care Overview  Goal: Plan of Care Review  Outcome: Ongoing (interventions implemented as appropriate)  VSS,, breastfeeding on demand, encouraged 8 times in 24 hours, wearing supportive bra. Fundus and lochia WDL. Voiding, passing flatus, ambulating and tolerating regular diet well. Bonding well with baby, Pain being managed with motrin and Vicodin  as needed. Stated an understanding to POC. Patient anticipates discharge 1-4-18

## 2018-01-04 NOTE — PROGRESS NOTES
Patient is postpartum day 2.  Doing well, no complaints. Denies h/a, vision changes, upper abd pain, chest pain, sob.    Patient Active Problem List   Diagnosis    History of pyelonephritis    Rh negative status during pregnancy    Supervision of other normal pregnancy    LGSIL on Pap smear of cervix    GBS (group B Streptococcus carrier), +RV culture, currently pregnant    Pregnancy with one fetus    Encounter for supervision of normal first pregnancy in third trimester       Temp:  [96.2 °F (35.7 °C)-98.6 °F (37 °C)] 98.1 °F (36.7 °C)  Pulse:  [70-88] 74  Resp:  [18] 18  BP: ()/(50-57) 98/56  Alert and oriented  Lungs: Normal respiratory effort.  Cv: RRR  Abd soft fundus firm and nontender  Ext: No significant asymmetric edema, no calf tenderness.      Recent Labs  Lab 18  0721 18  1517   WBC 8.20  --    HGB 10.7*  --      --    GROUPTRH  --  O NEG       A&P  22 y.o.  Post partum, doing well  Routine postpartum care  Birth control: BTL interval

## 2018-01-04 NOTE — DISCHARGE INSTRUCTIONS
After vaginal delivery:    Regular diet  Drink 6-8 glasses of water a day  Shower only for next 6 weeks.  If perineum sore, may soak in a few inches of warm water in tub.  No lifting anything more than 10 pounds.  No driving for 1 week  Walking is the only exercise allowed for 6 weeks  No sexual intercourse, no tampons, no douching for 6 weeks  Discuss with your doctor your birth control preferences at next visit  Wear supportive bra    Notify doctor if you have:  -Fever of 101 or higher  -Persistent nausea and vomiting  -Heavy vaginal bleeding or clots  -Swelling and pain in arms or legs  -Severe headaches, blurred vision, or fainting  -Frequency and burning with urination  Breastfeeding discharge instructions given with First Alert form and reviewed.  Also discussed:   AAP recommendation of exclusive breastfeeding for the first 6 months of life and continued breastfeeding with the introduction of supplemental foods beyond the first year of life.  Instructed on the recommendation to delay all bottle and pacifier use until after 4 weeks of age and breastfeeding is well established.  Discussed the benefits of exclusive breastfeeding for both mother and baby.  Discussed the risks of supplementation/pacifier use on the exclusivity of breastfeeding in the first 6 months. Feed the baby at the earliest sign of hunger or comfort  o Hands to mouth, sucking motions  o Rooting or searching for something to suck on  o Dont wait for crying - it is a not a late sign of hunger; it is a sign of distress     The feedings may be 8-12 times per 24hrs and will not follow a schedule   Alternate the breast you start the feeding with, or start with the breast that feels the fullest   Switch breasts when the baby takes himself off the breast or falls asleep   Keep offering breasts until the baby looks full, no longer gives hunger signs, and stays asleep when placed on his back in the crib   If the baby is sleepy and wont wake for  a feeding, put the baby skin-to-skin dressed in a diaper against the mothers bare chest   Sleep near your baby   The baby should be positioned and latched on to the breast correctly  o Chest-to-chest, chin in the breast  o Babys lips are flipped outward  o Babys mouth is stretched open wide like a shout  o Babys sucking should feel like tugging to the mother  - The baby should be drinking at the breast:  o You should hear swallowing or gulping throughout the feeding  o You should see milk on the babys lips when he comes off the breast  o Your breasts should be softer when the baby is finished feeding  o The baby should look relaxed at the end of feedings  o After the 4th day and your milk is in:  o The babys poop should turn bright yellow and be loose, watery, and seedy  o The baby should have at least 3-4 poops the size of the palm of your hand per day  o The baby should have at least 6-8 wet diapers per day  o The urine should be light yellow in color  You should drink when you are thirsty and eat a healthy diet when you are    hungry.     Take naps to get the rest you need.   Take medications and/or drink alcohol only with permission of your obstetrician    or the babys pediatrician.  You can also call the [facility],   ([phone]), Monday-Friday, 8am-5pm Central time, to get the most   up-to-date evidence-based information on the use of medications during   pregnancy and breastfeeding.      The baby should be examined by a pediatrician at 3-5 days of age; unless ordered sooner by the pediatrician.  Once your milk comes in, the baby should be back to birth weight no later than 10-14 days of age.

## 2018-01-04 NOTE — DISCHARGE SUMMARY
22 y.o.   OB History      Para Term  AB Living    4 4 4     4    SAB TAB Ectopic Multiple Live Births          0 4        Obstetric Comments    LMP 12        admitted for Delivery. See procedure note. Postpartum course was unremarkable.     Admit date 2018 11:34 AM  D/c date 2018    Disposition: Home  Activity 6 weeks pelvic rest  Diet: normal  DischargeFollow up in 6 weeks if vaginal delivery  Meds listed separately

## 2018-01-05 NOTE — ANESTHESIA POSTPROCEDURE EVALUATION
"Anesthesia Post Evaluation    Patient: Rosaura Boyd    Procedure(s) Performed: * No procedures listed *    Final Anesthesia Type: CSE  Patient location during evaluation: labor & delivery  Patient participation: Yes- Able to Participate  Level of consciousness: awake and alert and oriented  Post-procedure vital signs: reviewed and stable  Pain management: adequate  Airway patency: patent  PONV status at discharge: No PONV  Anesthetic complications: no      Cardiovascular status: blood pressure returned to baseline and hemodynamically stable  Respiratory status: unassisted, spontaneous ventilation and room air  Hydration status: euvolemic  Follow-up not needed.        Visit Vitals  BP (!) 98/54   Pulse 69   Temp 36.9 °C (98.5 °F)   Resp 17   Ht 5' 3" (1.6 m)   Wt 70.8 kg (156 lb)   LMP 03/22/2017   SpO2 (!) 93%   Breastfeeding? Yes   BMI 27.63 kg/m²       Pain/Najma Score: Pain Assessment Performed: Yes (1/4/2018  6:30 AM)  Presence of Pain: non-verbal indicators present (1/4/2018  6:30 AM)  Pain Rating Prior to Med Admin: 0 (1/4/2018 12:00 PM)  Pain Rating Post Med Admin: 0 (facial score 0/10, asleep, NAD) (1/4/2018  6:30 AM)      "

## 2018-01-06 ENCOUNTER — TELEPHONE (OUTPATIENT)
Dept: OBSTETRICS AND GYNECOLOGY | Facility: HOSPITAL | Age: 23
End: 2018-01-06

## 2018-01-07 ENCOUNTER — TELEPHONE (OUTPATIENT)
Dept: OBSTETRICS AND GYNECOLOGY | Facility: HOSPITAL | Age: 23
End: 2018-01-07

## 2018-01-07 NOTE — TELEPHONE ENCOUNTER
"Spoke to JASPER who states '" she can't come to the phone' but baby is breastfeeding well and having many wet and dirty diapers -encourage to have mother call us with any concerns -states okay   "

## 2018-02-16 PROBLEM — R87.612 LGSIL ON PAP SMEAR OF CERVIX: Status: RESOLVED | Noted: 2017-07-27 | Resolved: 2018-02-16

## 2018-02-16 PROBLEM — O99.820 GBS (GROUP B STREPTOCOCCUS CARRIER), +RV CULTURE, CURRENTLY PREGNANT: Status: RESOLVED | Noted: 2017-12-13 | Resolved: 2018-02-16

## 2018-03-12 ENCOUNTER — HOSPITAL ENCOUNTER (OUTPATIENT)
Dept: PREADMISSION TESTING | Facility: HOSPITAL | Age: 23
Discharge: HOME | End: 2018-03-12
Attending: OBSTETRICS & GYNECOLOGY
Payer: MEDICAID

## 2018-03-12 VITALS
HEIGHT: 63 IN | WEIGHT: 148.81 LBS | RESPIRATION RATE: 17 BRPM | TEMPERATURE: 98 F | DIASTOLIC BLOOD PRESSURE: 66 MMHG | SYSTOLIC BLOOD PRESSURE: 99 MMHG | BODY MASS INDEX: 26.37 KG/M2 | OXYGEN SATURATION: 97 % | HEART RATE: 71 BPM

## 2018-03-12 DIAGNOSIS — Z01.818 PRE-OP TESTING: Primary | ICD-10-CM

## 2018-03-12 LAB
BASOPHILS # BLD AUTO: 0.01 K/UL
BASOPHILS NFR BLD: 0.2 %
DIFFERENTIAL METHOD: ABNORMAL
EOSINOPHIL # BLD AUTO: 0.3 K/UL
EOSINOPHIL NFR BLD: 4.5 %
ERYTHROCYTE [DISTWIDTH] IN BLOOD BY AUTOMATED COUNT: 14.2 %
HCT VFR BLD AUTO: 37.8 %
HGB BLD-MCNC: 12.4 G/DL
LYMPHOCYTES # BLD AUTO: 2 K/UL
LYMPHOCYTES NFR BLD: 34.7 %
MCH RBC QN AUTO: 26.4 PG
MCHC RBC AUTO-ENTMCNC: 32.8 G/DL
MCV RBC AUTO: 81 FL
MONOCYTES # BLD AUTO: 0.5 K/UL
MONOCYTES NFR BLD: 8.4 %
NEUTROPHILS # BLD AUTO: 3 K/UL
NEUTROPHILS NFR BLD: 52 %
PLATELET # BLD AUTO: 253 K/UL
PMV BLD AUTO: 10.5 FL
RBC # BLD AUTO: 4.69 M/UL
WBC # BLD AUTO: 5.73 K/UL

## 2018-03-12 PROCEDURE — 85025 COMPLETE CBC W/AUTO DIFF WBC: CPT

## 2018-03-12 PROCEDURE — 36415 COLL VENOUS BLD VENIPUNCTURE: CPT

## 2018-03-12 NOTE — DISCHARGE INSTRUCTIONS
Your surgery is scheduled for __Wednesday March 21, 2018__________________.    Call [phone] between 2 p.m. and 5 p.m. on   _Tuesday______________ to find out your arrival time for the day of your surgery.      Please report to SAME DAY SURGERY UNIT on the 2nd FLOOR at _______ a.m.  Use front door entrance. The doors open at 0530 am.        INSTRUCTIONS IMPORTANT!!!  ¨ Do not eat or drink after 12 midnight-including water. OK to brush teeth, no   gum, candy or mints!      _x___  Prep instructions:    SHOWER    _x___  Please shower using Hibiclens soap the night before AND  the morning of your surgery/procedure. Do not use Hibiclens on your face or genitals      x         If your surgery is around your belly button (Navel) be sure to wash inside your belly button also. Rinse hibiclens off completely.  _x___  No shaving of procedural area at least 4-5 days before surgery due to ncreased risk of skin irritation and/or possible infection.  _x___  Do not wear makeup, including mascara. WEARING EYE MAKEUP MAY LEAD TO SERIOUS EYE INJURY during surgery.  __x__  No powder, lotions or creams to your body.  __x__  You may wear only deodorant on the day of surgery.  __x__  Please remove all jewelry, including piercings and leave at home.  __x__  No money or valuables needed. Please leave at home.  You may bring your cell phone.  ____  Please bring any documents given by your doctor.  _x___  If going home the same day, arrange for a ride home. You will not be able to   drive if Anesthesia was used.  ____  Children under 18 years require a parent / guardian present the entire time   they are in surgery / recovery.  _x___  Wear loose fitting clothing. Allow for dressings, bandages.  _x___  Stop Aspirin, Ibuprofen, Motrin and Aleve at least 3-5 days before surgery, unless otherwise instructed by your doctor, or the nurse.              You MAY use Tylenol/acetaminophen until day of surgery.  ____  If you take diabetic medication,  do not take am of surgery unless instructed by   Doctor.  _x___  Call MD for temperature above 101 degrees.        __x__ Stop taking any Fish Oil supplement or any Vitamins that contain Vitamin E at least 5 days prior to surgery.          I have read or had read and explained to me, and understand the above information.  Additional comments or instructions:Please call   [phone] if you have any questions regarding the instructions above.

## 2018-03-12 NOTE — PRE ADMISSION SCREENING
Patient breast feeding baby.  Instructed before surgery to pump and save milk for 2 days feeding  post surgery.  Following surgery to pump and discard milk.  Expressed understanding.

## 2018-03-20 ENCOUNTER — ANESTHESIA EVENT (OUTPATIENT)
Dept: SURGERY | Facility: HOSPITAL | Age: 23
End: 2018-03-20
Payer: MEDICAID

## 2018-03-21 ENCOUNTER — HOSPITAL ENCOUNTER (OUTPATIENT)
Facility: HOSPITAL | Age: 23
Discharge: HOME | End: 2018-03-21
Attending: OBSTETRICS & GYNECOLOGY | Admitting: OBSTETRICS & GYNECOLOGY
Payer: MEDICAID

## 2018-03-21 ENCOUNTER — ANESTHESIA (OUTPATIENT)
Dept: SURGERY | Facility: HOSPITAL | Age: 23
End: 2018-03-21
Payer: MEDICAID

## 2018-03-21 VITALS
HEART RATE: 65 BPM | RESPIRATION RATE: 20 BRPM | WEIGHT: 148.81 LBS | OXYGEN SATURATION: 100 % | TEMPERATURE: 98 F | DIASTOLIC BLOOD PRESSURE: 67 MMHG | BODY MASS INDEX: 26.37 KG/M2 | HEIGHT: 63 IN | SYSTOLIC BLOOD PRESSURE: 114 MMHG

## 2018-03-21 DIAGNOSIS — Z30.09 FAMILY PLANNING: ICD-10-CM

## 2018-03-21 DIAGNOSIS — Z30.09 UNWANTED FERTILITY: Primary | ICD-10-CM

## 2018-03-21 LAB
ABO + RH BLD: NORMAL
B-HCG UR QL: NEGATIVE
BLD GP AB SCN CELLS X3 SERPL QL: NORMAL

## 2018-03-21 PROCEDURE — 37000008 HC ANESTHESIA 1ST 15 MINUTES: Performed by: OBSTETRICS & GYNECOLOGY

## 2018-03-21 PROCEDURE — 71000039 HC RECOVERY, EACH ADD'L HOUR: Performed by: OBSTETRICS & GYNECOLOGY

## 2018-03-21 PROCEDURE — 00851 ANES IPER PX TUBAL LIGATION: CPT | Performed by: OBSTETRICS & GYNECOLOGY

## 2018-03-21 PROCEDURE — 81025 URINE PREGNANCY TEST: CPT

## 2018-03-21 PROCEDURE — 36415 COLL VENOUS BLD VENIPUNCTURE: CPT

## 2018-03-21 PROCEDURE — 36000709 HC OR TIME LEV III EA ADD 15 MIN: Performed by: OBSTETRICS & GYNECOLOGY

## 2018-03-21 PROCEDURE — 25000003 PHARM REV CODE 250: Performed by: NURSE ANESTHETIST, CERTIFIED REGISTERED

## 2018-03-21 PROCEDURE — 88302 TISSUE EXAM BY PATHOLOGIST: CPT | Mod: 26,,, | Performed by: PATHOLOGY

## 2018-03-21 PROCEDURE — 63600175 PHARM REV CODE 636 W HCPCS: Performed by: NURSE ANESTHETIST, CERTIFIED REGISTERED

## 2018-03-21 PROCEDURE — 63600175 PHARM REV CODE 636 W HCPCS: Performed by: ANESTHESIOLOGY

## 2018-03-21 PROCEDURE — D9220A PRA ANESTHESIA: Mod: ANES,,, | Performed by: ANESTHESIOLOGY

## 2018-03-21 PROCEDURE — 36000708 HC OR TIME LEV III 1ST 15 MIN: Performed by: OBSTETRICS & GYNECOLOGY

## 2018-03-21 PROCEDURE — D9220A PRA ANESTHESIA: Mod: CRNA,,, | Performed by: NURSE ANESTHETIST, CERTIFIED REGISTERED

## 2018-03-21 PROCEDURE — 88302 TISSUE EXAM BY PATHOLOGIST: CPT | Performed by: PATHOLOGY

## 2018-03-21 PROCEDURE — 71000015 HC POSTOP RECOV 1ST HR: Performed by: OBSTETRICS & GYNECOLOGY

## 2018-03-21 PROCEDURE — 27201423 OPTIME MED/SURG SUP & DEVICES STERILE SUPPLY: Performed by: OBSTETRICS & GYNECOLOGY

## 2018-03-21 PROCEDURE — 37000009 HC ANESTHESIA EA ADD 15 MINS: Performed by: OBSTETRICS & GYNECOLOGY

## 2018-03-21 PROCEDURE — 86901 BLOOD TYPING SEROLOGIC RH(D): CPT

## 2018-03-21 PROCEDURE — 25000003 PHARM REV CODE 250: Performed by: ANESTHESIOLOGY

## 2018-03-21 PROCEDURE — 71000033 HC RECOVERY, INTIAL HOUR: Performed by: OBSTETRICS & GYNECOLOGY

## 2018-03-21 RX ORDER — FENTANYL CITRATE 50 UG/ML
25 INJECTION, SOLUTION INTRAMUSCULAR; INTRAVENOUS EVERY 5 MIN PRN
Status: COMPLETED | OUTPATIENT
Start: 2018-03-21 | End: 2018-03-21

## 2018-03-21 RX ORDER — DIPHENHYDRAMINE HYDROCHLORIDE 50 MG/ML
25 INJECTION INTRAMUSCULAR; INTRAVENOUS EVERY 4 HOURS PRN
Status: DISCONTINUED | OUTPATIENT
Start: 2018-03-21 | End: 2018-03-21 | Stop reason: HOSPADM

## 2018-03-21 RX ORDER — ROCURONIUM BROMIDE 10 MG/ML
INJECTION, SOLUTION INTRAVENOUS
Status: DISCONTINUED | OUTPATIENT
Start: 2018-03-21 | End: 2018-03-21

## 2018-03-21 RX ORDER — NEOSTIGMINE METHYLSULFATE 1 MG/ML
INJECTION, SOLUTION INTRAVENOUS
Status: DISCONTINUED | OUTPATIENT
Start: 2018-03-21 | End: 2018-03-21

## 2018-03-21 RX ORDER — HYDROCODONE BITARTRATE AND ACETAMINOPHEN 10; 325 MG/1; MG/1
1 TABLET ORAL EVERY 4 HOURS PRN
Status: DISCONTINUED | OUTPATIENT
Start: 2018-03-21 | End: 2018-03-21 | Stop reason: HOSPADM

## 2018-03-21 RX ORDER — DIPHENHYDRAMINE HCL 25 MG
25 CAPSULE ORAL EVERY 4 HOURS PRN
Status: DISCONTINUED | OUTPATIENT
Start: 2018-03-21 | End: 2018-03-21 | Stop reason: HOSPADM

## 2018-03-21 RX ORDER — MIDAZOLAM HYDROCHLORIDE 1 MG/ML
INJECTION, SOLUTION INTRAMUSCULAR; INTRAVENOUS
Status: DISCONTINUED | OUTPATIENT
Start: 2018-03-21 | End: 2018-03-21

## 2018-03-21 RX ORDER — HYDROCODONE BITARTRATE AND ACETAMINOPHEN 5; 325 MG/1; MG/1
1 TABLET ORAL EVERY 4 HOURS PRN
Status: DISCONTINUED | OUTPATIENT
Start: 2018-03-21 | End: 2018-03-21 | Stop reason: HOSPADM

## 2018-03-21 RX ORDER — ONDANSETRON 4 MG/1
8 TABLET, ORALLY DISINTEGRATING ORAL EVERY 8 HOURS PRN
Status: DISCONTINUED | OUTPATIENT
Start: 2018-03-21 | End: 2018-03-21 | Stop reason: HOSPADM

## 2018-03-21 RX ORDER — ONDANSETRON 2 MG/ML
INJECTION INTRAMUSCULAR; INTRAVENOUS
Status: DISCONTINUED | OUTPATIENT
Start: 2018-03-21 | End: 2018-03-21

## 2018-03-21 RX ORDER — SODIUM CHLORIDE, SODIUM LACTATE, POTASSIUM CHLORIDE, CALCIUM CHLORIDE 600; 310; 30; 20 MG/100ML; MG/100ML; MG/100ML; MG/100ML
INJECTION, SOLUTION INTRAVENOUS CONTINUOUS
Status: DISCONTINUED | OUTPATIENT
Start: 2018-03-21 | End: 2018-03-21 | Stop reason: HOSPADM

## 2018-03-21 RX ORDER — LIDOCAINE HCL/PF 100 MG/5ML
SYRINGE (ML) INTRAVENOUS
Status: DISCONTINUED | OUTPATIENT
Start: 2018-03-21 | End: 2018-03-21

## 2018-03-21 RX ORDER — IBUPROFEN 600 MG/1
600 TABLET ORAL EVERY 6 HOURS PRN
Status: DISCONTINUED | OUTPATIENT
Start: 2018-03-21 | End: 2018-03-21 | Stop reason: HOSPADM

## 2018-03-21 RX ORDER — PROPOFOL 10 MG/ML
VIAL (ML) INTRAVENOUS
Status: DISCONTINUED | OUTPATIENT
Start: 2018-03-21 | End: 2018-03-21

## 2018-03-21 RX ORDER — GLYCOPYRROLATE 0.2 MG/ML
INJECTION INTRAMUSCULAR; INTRAVENOUS
Status: DISCONTINUED | OUTPATIENT
Start: 2018-03-21 | End: 2018-03-21

## 2018-03-21 RX ORDER — SODIUM CHLORIDE 0.9 % (FLUSH) 0.9 %
3 SYRINGE (ML) INJECTION
Status: DISCONTINUED | OUTPATIENT
Start: 2018-03-21 | End: 2018-03-21 | Stop reason: HOSPADM

## 2018-03-21 RX ORDER — SODIUM CHLORIDE 9 MG/ML
20 INJECTION, SOLUTION INTRAVENOUS CONTINUOUS
Status: DISCONTINUED | OUTPATIENT
Start: 2018-03-21 | End: 2018-03-21 | Stop reason: HOSPADM

## 2018-03-21 RX ORDER — FENTANYL CITRATE 50 UG/ML
INJECTION, SOLUTION INTRAMUSCULAR; INTRAVENOUS
Status: DISCONTINUED | OUTPATIENT
Start: 2018-03-21 | End: 2018-03-21

## 2018-03-21 RX ORDER — LIDOCAINE HYDROCHLORIDE 10 MG/ML
1 INJECTION, SOLUTION EPIDURAL; INFILTRATION; INTRACAUDAL; PERINEURAL ONCE
Status: DISCONTINUED | OUTPATIENT
Start: 2018-03-21 | End: 2018-03-21 | Stop reason: HOSPADM

## 2018-03-21 RX ADMIN — FENTANYL CITRATE 25 MCG: 50 INJECTION, SOLUTION INTRAMUSCULAR; INTRAVENOUS at 09:03

## 2018-03-21 RX ADMIN — MIDAZOLAM HYDROCHLORIDE 2 MG: 1 INJECTION, SOLUTION INTRAMUSCULAR; INTRAVENOUS at 08:03

## 2018-03-21 RX ADMIN — SODIUM CHLORIDE, SODIUM LACTATE, POTASSIUM CHLORIDE, AND CALCIUM CHLORIDE 10 ML/HR: .6; .31; .03; .02 INJECTION, SOLUTION INTRAVENOUS at 07:03

## 2018-03-21 RX ADMIN — LIDOCAINE HYDROCHLORIDE 100 MG: 20 INJECTION, SOLUTION INTRAVENOUS at 08:03

## 2018-03-21 RX ADMIN — GLYCOPYRROLATE 0.2 MG: 0.2 INJECTION, SOLUTION INTRAMUSCULAR; INTRAVENOUS at 09:03

## 2018-03-21 RX ADMIN — NEOSTIGMINE METHYLSULFATE 2.5 MG: 1 INJECTION INTRAVENOUS at 09:03

## 2018-03-21 RX ADMIN — GLYCOPYRROLATE 0.2 MG: 0.2 INJECTION, SOLUTION INTRAMUSCULAR; INTRAVENOUS at 08:03

## 2018-03-21 RX ADMIN — ONDANSETRON 4 MG: 2 INJECTION, SOLUTION INTRAMUSCULAR; INTRAVENOUS at 08:03

## 2018-03-21 RX ADMIN — PROPOFOL 150 MG: 10 INJECTION, EMULSION INTRAVENOUS at 08:03

## 2018-03-21 RX ADMIN — ROCURONIUM BROMIDE 30 MG: 10 INJECTION, SOLUTION INTRAVENOUS at 08:03

## 2018-03-21 RX ADMIN — FENTANYL CITRATE 100 MCG: 50 INJECTION INTRAMUSCULAR; INTRAVENOUS at 08:03

## 2018-03-21 NOTE — INTERVAL H&P NOTE
The patient has been examined and the H&P has been reviewed:    I concur with the findings and no changes have occurred since H&P was written.    Anesthesia/Surgery risks, benefits and alternative options discussed and understood by patient/family.          Active Hospital Problems    Diagnosis  POA    Unwanted fertility [Z30.09]  Yes    Family planning [Z30.09]  Yes      Resolved Hospital Problems    Diagnosis Date Resolved POA   No resolved problems to display.

## 2018-03-21 NOTE — DISCHARGE SUMMARY
23 y.o. admitted for laparoscopic tubal ligation for UDF thru [facility].  Operative and postoperative course unremarkable.     D/c to home  Admit date 3/21/2018  7:19 AM  D/c date 03/21/2018  D/c dx LSC BTL  Procedures laparoscopy BTL        D/c diet as tolerated  D/c meds per med rec  D/c f/u one week  D/c instructions pelvic rest and avoid heavy lifting

## 2018-03-21 NOTE — OP NOTE
Preop Dx: Undesired fertility    Postop Dx: same    Findings: normal pelvis    Procedure: LSC BTL (full salpingectomies)    Surgeon: Goyo Pat MD    Complications: none    EBL: <100cc    Path: Bilateral tubal segments    Patient was prepped and draped in usual sterile fashion. Ponce was placed.   A uterine manipulator was placed in the uterus.  A small incision was made in the umbilicus. There was an initial low pressure.  Two additional trocars were placed in the lower quadrants under direct visualization.  The pelvis was inspected and noted to be normal.  Each fallopian tube was grasped and excised using cautery and scissors.  The  edges were recauterized as needed. The tubes were removed through the port. Good hemostasis was obtained.  All instruments were removed from the abdomen.  The abdomen was desufflated and skin incisions were closed dermabond (or other medical grade glue).  Sterile dressings were applied.  Sponge lap and needle counts were correct x 2.  Patient was taken to recovery in good condition.

## 2018-03-21 NOTE — DISCHARGE INSTRUCTIONS
BATHING:                   You may shower after your dressing is removed, but no tub baths, hot tubs, saunas or swimming until you see the doctor.    DRESSING:  ? Remove your bandage 24-48 hours.. If there are skin tapes over the incision, leave them in place. They will start to come off in 5-7 days.  ACTIVITY LEVEL: If you have received sedation or an anesthetic, you may feel sleepy for   several hours. Rest until you are more awake. Gradually resume your normal activities  ? No heavy lifting or straining, nothing over 10 lbs., like a gallon of milk.  ? Pelvic rest- no sex, tampons or douching until follow up or instructed by doctor.  DIET:  You may resume your home diet. If nausea is present, increase your diet gradually with fluids and bland foods.    Medications:  Pain medication should be taken only if needed and as directed. If antibiotics are prescribed, the medication should be taken until completed. You will be given an updated list of you medications.  ? No driving, alcoholic beverages or signing legal documents for next 24 hours or while taking pain medication    CALL THE DOCTOR:    For any obvious bleeding (some dried blood over the incision is normal).      Redness, swelling, foul smell around incision or fever over 101.   Shortness of breath, Coughing up Bloody Sputum or Pains or Swelling in your calves.   Persistent pain or nausea not relieved by medication.   If vaginal bleeding is in excess of a normal period.   Problems urinating    If any unusual problems or difficulties occur contact your doctor. If you cannot contact your doctor but feel your signs and symptoms warrant a physicians attention return to the emergency room.  Fall Prevention  Millions of people fall every year and injure themselves. You may have had anesthesia or sedation which may increase your risk of falling. You may have health issues that put you at an increased risk of falling.     Here are ways to reduce your risk of  falling.  ·   · Make your home safe by keeping walkways clear of objects you may trip over.  · Use non-slip pads under rugs. Do not use area rugs or small throw rugs.  · Use non-slip mats in bathtubs and showers.  · Install handrails and lights on staircases.  · Do not walk in poorly lit areas.  · Do not stand on chairs or wobbly ladders.  · Use caution when reaching overhead or looking upward. This position can cause a loss of balance.  · Be sure your shoes fit properly, have non-slip bottoms and are in good condition.   · Wear shoes both inside and out. Avoid going barefoot or wearing slippers.  · Be cautious when going up and down stairs, curbs, and when walking on uneven sidewalks.  · If your balance is poor, consider using a cane or walker.  · If your fall was related to alcohol use, stop or limit alcohol intake.   · If your fall was related to use of sleeping medicines, talk to your doctor about this. You may need to reduce your dosage at bedtime if you awaken during the night to go to the bathroom.    · To reduce the need for nighttime bathroom trips:  ¨ Avoid drinking fluids for several hours before going to bed  ¨ Empty your bladder before going to bed  ¨ Men can keep a urinal at the bedside  · Stay as active as you can. Balance, flexibility, strength, and endurance all come from exercise. They all play a role in preventing falls. Ask your healthcare provider which types of activity are right for you.  · Get your vision checked on a regular basis.  · If you have pets, know where they are before you stand up or walk so you don't trip over them.  Use night lights.DRESSING:  You have Dermabond covering your incision.  It is a clear liquid that dries like super glue.  Do not try to remove it.  It will flake off over time.  You may wash it with soap and water in the shower.  Do not apply any creams or lotion on it.    BATHING:    You may shower, but no tub baths until you see the doctor.    ACTIVITY LEVEL: If  you have received sedation or an anesthetic, you may feel sleepy for several hours. Rest until you are more awake. Gradually resume your normal activities. No driving or alcoholic beverages for 24 hours.     No driving, alcoholic beverages or signing legal documents for next 24 hours or while taking pain medication    DIET: You may resume your home diet. If nausea is present, increase your diet gradually with fluids and bland foods.    Medications: Pain medication should be taken only if needed and as directed. If antibiotics are prescribed, the medication should be taken until completed. You will be given an updated list of you medications.    CALL THE DOCTOR:   For any obvious bleeding (some dried blood over the incision is normal).                         Redness, swelling, foul smell around incision or fever over            101.                    Shortness of breath.  Persistent pain or nausea not relieved by medication.

## 2018-03-21 NOTE — H&P (VIEW-ONLY)
History and Physical  Obstetrics&Gynecology        SUBJECTIVE:   CC: undesired fertility  HPI: Rosaura Boyd is a 23 y.o.  female wanting a permanent tubal ligation for UDF. Declines LARCs and vasectomy and other options. Oligomenorrhea history.      (Not in a hospital admission)  Review of patient's allergies indicates:   Allergen Reactions    Iodine and iodide containing products Hives    Latex, natural rubber Rash      Past Medical History:   Diagnosis Date    Asthma     Depression     Pt has Hx of depression and self mutilation (cutting)    Disorder of kidney and ureter      No past surgical history on file.  Family History   Problem Relation Age of Onset    Lupus Mother      Social History   Substance Use Topics    Smoking status: Never Smoker    Smokeless tobacco: Never Used    Alcohol use No      Comment: not currently. socially       OBJECTIVE:      [unfilled]    Physical Exam:  Gen: Alert and Oriented x 3, NAD, Well nourished female  Neck: supple, No LAD  Cv: Regular rate  Lungs: Normal respiratory effort  Abd: Nondistended, tender but No Guarding/rebounding, No HSM  Pelvic:     Vulva: normal, no lesions    Uterus: normal size, mobile, NT    Cervix: Normal, no masses, No CMT    Vagina: No masses noted    Adnexa: No adnexal masses, NT    Urethra/bladder: no masses, nontender  Extremeties: Nt, no significant assymetric swelling      Labs:     ASSESSMENT/PLAN:      Ap: UDF- for LSC BTL full salpingectomy discussed. R/B/A discussed in detail.   LSIL pap- age 23. Colpo suggested.  Oligomenorrhea- prior to pregnancy. F/u labs

## 2018-03-22 PROBLEM — Z30.09 FAMILY PLANNING: Status: RESOLVED | Noted: 2018-03-21 | Resolved: 2018-03-22

## 2018-03-22 PROBLEM — Z34.90 PREGNANCY WITH ONE FETUS: Status: RESOLVED | Noted: 2017-12-27 | Resolved: 2018-03-22

## 2018-03-22 PROBLEM — Z98.51 H/O TUBAL LIGATION: Chronic | Status: ACTIVE | Noted: 2018-03-22

## 2018-03-22 PROBLEM — Z30.09 UNWANTED FERTILITY: Status: RESOLVED | Noted: 2018-03-21 | Resolved: 2018-03-22

## 2018-03-22 NOTE — ANESTHESIA POSTPROCEDURE EVALUATION
"Anesthesia Post Evaluation    Patient: Rosaura Bianchi    Procedure(s) Performed: Procedure(s) (LRB):  [id number] (N/A)    Final Anesthesia Type: general  Patient location during evaluation: PACU  Patient participation: Yes- Able to Participate  Level of consciousness: awake and alert and oriented  Post-procedure vital signs: reviewed and stable  Pain management: adequate  Airway patency: patent  PONV status at discharge: No PONV  Anesthetic complications: no      Cardiovascular status: blood pressure returned to baseline and hemodynamically stable  Respiratory status: unassisted and spontaneous ventilation  Hydration status: euvolemic  Follow-up not needed.        Visit Vitals  /67 (BP Location: Right arm, Patient Position: Lying)   Pulse 65   Temp 36.4 °C (97.6 °F) (Oral)   Resp 20   Ht 5' 3" (1.6 m)   Wt 67.5 kg (148 lb 13 oz)   SpO2 100%   Breastfeeding? Yes   BMI 26.36 kg/m²       Pain/Najma Score: Pain Assessment Performed: Yes (3/21/2018 11:23 AM)  Presence of Pain: denies (3/21/2018 11:23 AM)  Pain Rating Prior to Med Admin: 3 (3/21/2018 10:23 AM)  Najma Score: 10 (3/21/2018 10:23 AM)  Modified Najma Score: 20 (3/21/2018 10:30 AM)      "

## 2018-03-22 NOTE — ANESTHESIA PREPROCEDURE EVALUATION
03/22/2018  Rosaura Bianchi is a 23 y.o., female.    Anesthesia Evaluation     I have reviewed the Nursing Notes.      Review of Systems  Anesthesia Hx:  No problems with previous Anesthesia   Social:  Former Smoker    Cardiovascular:  Cardiovascular Normal Exercise tolerance: good     Pulmonary:   Asthma    Renal/:   Chronic Renal Disease    Hepatic/GI:  Hepatic/GI Normal    Neurological:  Neurology Normal    Endocrine:  Endocrine Normal    Psych:   Psychiatric History          Physical Exam  General:  Well nourished    Airway/Jaw/Neck:  AIRWAY FINDINGS: Normal           Mental Status:  Mental Status Findings: Normal        Anesthesia Plan  Type of Anesthesia, risks & benefits discussed:  Anesthesia Type:  general  Patient's Preference:   Intra-op Monitoring Plan: standard ASA monitors  Intra-op Monitoring Plan Comments:   Post Op Pain Control Plan:   Post Op Pain Control Plan Comments:   Induction:   IV  Beta Blocker:  Patient is not currently on a Beta-Blocker (No further documentation required).       Informed Consent: Patient understands risks and agrees with Anesthesia plan.  Questions answered. Anesthesia consent signed with patient.  ASA Score: 2     Day of Surgery Review of History & Physical:    H&P update referred to the surgeon.         Ready For Surgery From Anesthesia Perspective.

## 2018-09-24 ENCOUNTER — HOSPITAL ENCOUNTER (EMERGENCY)
Facility: HOSPITAL | Age: 23
Discharge: HOME | End: 2018-09-24
Attending: SURGERY
Payer: MEDICAID

## 2018-09-24 VITALS
RESPIRATION RATE: 18 BRPM | HEART RATE: 86 BPM | DIASTOLIC BLOOD PRESSURE: 73 MMHG | SYSTOLIC BLOOD PRESSURE: 118 MMHG | TEMPERATURE: 99 F | WEIGHT: 134.5 LBS | BODY MASS INDEX: 23.82 KG/M2 | OXYGEN SATURATION: 98 %

## 2018-09-24 DIAGNOSIS — J02.9 PHARYNGITIS, UNSPECIFIED ETIOLOGY: Primary | ICD-10-CM

## 2018-09-24 PROCEDURE — 63600175 PHARM REV CODE 636 W HCPCS: Performed by: SURGERY

## 2018-09-24 PROCEDURE — 99283 EMERGENCY DEPT VISIT LOW MDM: CPT | Mod: 25

## 2018-09-24 PROCEDURE — 96372 THER/PROPH/DIAG INJ SC/IM: CPT

## 2018-09-24 RX ORDER — AMOXICILLIN AND CLAVULANATE POTASSIUM 875; 125 MG/1; MG/1
1 TABLET, FILM COATED ORAL 2 TIMES DAILY
Qty: 20 TABLET | Refills: 0 | Status: SHIPPED | OUTPATIENT
Start: 2018-09-24 | End: 2018-09-24 | Stop reason: SDUPTHER

## 2018-09-24 RX ORDER — AMOXICILLIN AND CLAVULANATE POTASSIUM 875; 125 MG/1; MG/1
1 TABLET, FILM COATED ORAL 2 TIMES DAILY
Qty: 20 TABLET | Refills: 0 | Status: SHIPPED | OUTPATIENT
Start: 2018-09-24 | End: 2018-10-04

## 2018-09-24 RX ORDER — METHYLPREDNISOLONE SOD SUCC 125 MG
125 VIAL (EA) INJECTION
Status: COMPLETED | OUTPATIENT
Start: 2018-09-24 | End: 2018-09-24

## 2018-09-24 RX ORDER — METHYLPREDNISOLONE 4 MG/1
TABLET ORAL
Qty: 1 PACKAGE | Refills: 0 | Status: SHIPPED | OUTPATIENT
Start: 2018-09-24 | End: 2018-10-02

## 2018-09-24 RX ORDER — METHYLPREDNISOLONE 4 MG/1
TABLET ORAL
Qty: 1 PACKAGE | Refills: 0 | Status: SHIPPED | OUTPATIENT
Start: 2018-09-24 | End: 2018-09-24 | Stop reason: SDUPTHER

## 2018-09-24 RX ADMIN — METHYLPREDNISOLONE SODIUM SUCCINATE 125 MG: 125 INJECTION, POWDER, FOR SOLUTION INTRAMUSCULAR; INTRAVENOUS at 09:09

## 2018-09-25 NOTE — ED NOTES
Patient discharged home with mother.  Mother verbalizes understanding of instructions.  Mother will follow up with PCP next week if symptoms worsen.  Mother has no questions or concerns at this time.

## 2018-09-25 NOTE — ED PROVIDER NOTES
[facility]                                                 Chief Complaint  23 y.o. female with Sore Throat    History of Present Illness  Rosaura Boyd presents to the emergency room with sore throat this week  Patient has had on again off again sore throat for last 2 days, denies fever  Patient on exam has pharyngitis without tonsillitis or exudate noted now  Patient has normal phonation, normal swallowing, no stridor and no drooling  Patient has no fever and no signs of peritonsillar abscess on ER ROS today  Patient only complaint today sore throat, afebrile with good stable vital signs    The history is provided by the patient   device was not used during this ER visit  Medical history:  Asthma, depression, kidney and ureter disorder, vaginal delivery  Surgeries: Tubal ligation   Allergies: Iodine and latex    Review of Systems and Physical Exam      Review of Systems  -- Constitution - no fever, denies fatigue, no weakness, no chills  -- Eyes - no tearing or redness, no visual disturbance  -- Ear, Nose - no tinnitus or earache, no nasal congestion or discharge  -- Mouth,Throat - sore throat, no toothache, normal voice, normal swallowing  -- Respiratory - denies cough and congestion, no shortness of breath, no DELGADO  -- Cardiovascular - denies chest pain, no palpitations, denies claudication  -- Gastrointestinal - denies abdominal pain, nausea, vomiting, or diarrhea  -- Genitourinary - no dysuria, no hematuria, no flank pain, no bladder pain  -- Musculoskeletal - denies back pain, negative for myalgias and arthralgias   -- Neurological - no headache, denies weakness or seizure; no LOC  -- Skin - denies pallor, rash, or changes in skin. no hives or welts noted    Vital Signs  Her oral temperature is 98.6 °F (37 °C).   Her blood pressure is 118/73 and her pulse is 86.   Her respiration is 18 and oxygen saturation is 98%.     Physical Exam  -- Nursing note and vitals  reviewed  -- Constitutional: Appears well-developed and well-nourished  -- Head: Atraumatic. Normocephalic. No obvious abnormality  -- Eyes: Pupils are equal and reactive to light. Normal conjunctiva and lids  -- Nose: Nose normal in appearance, nares grossly normal. No discharge  -- Throat: mild posterior oropharnyx erythema with no exudate  -- Ears: External ears and TM normal bilaterally. Normal hearing and no drainage  -- Neck: Normal range of motion. Neck supple. No masses, trachea midline  -- Cardiac: Normal rate, regular rhythm and normal heart sounds  -- Pulmonary: Normal respiratory effort, breath sounds clear to auscultation  -- Abdominal: Soft, no tenderness. Normal bowel sounds. Normal liver edge  -- Musculoskeletal: Normal range of motion, no effusions. Joints stable   -- Neurological: No focal deficits. Showed good interaction with staff  -- Skin: Warm and dry. No evidence of rash or cellulitis    Emergency Room Course      Treatment and Evaluation  --  mg Solumedrol given today in the ER    Diagnosis  -- The encounter diagnosis was Pharyngitis, unspecified etiology.    Disposition and Plan  -- Disposition: home  -- Condition: stable  -- Follow-up: Patient to follow up with [facility] in 1-2 days.  -- I advised the patient that we have found no life threatening condition today  -- At this time, I believe the patient is clinically stable for discharge.   -- The patient acknowledges that close follow up with a MD is required   -- Patient agrees to comply with all instruction and direction given in the ER    This note is dictated on Dragon Natural Speaking word recognition program.  There are word recognition mistakes that are occasionally missed on review.          Piter Rodriguez MD  09/24/18 [id number]

## 2018-10-02 ENCOUNTER — HOSPITAL ENCOUNTER (EMERGENCY)
Facility: HOSPITAL | Age: 23
Discharge: HOME | End: 2018-10-02
Attending: SURGERY
Payer: MEDICAID

## 2018-10-02 VITALS
BODY MASS INDEX: 23.46 KG/M2 | HEART RATE: 78 BPM | HEIGHT: 63 IN | TEMPERATURE: 99 F | DIASTOLIC BLOOD PRESSURE: 60 MMHG | WEIGHT: 132.38 LBS | RESPIRATION RATE: 16 BRPM | OXYGEN SATURATION: 99 % | SYSTOLIC BLOOD PRESSURE: 122 MMHG

## 2018-10-02 DIAGNOSIS — J02.9 PHARYNGITIS, UNSPECIFIED ETIOLOGY: Primary | ICD-10-CM

## 2018-10-02 LAB
ALBUMIN SERPL BCP-MCNC: 3.8 G/DL
ALP SERPL-CCNC: 102 U/L
ALT SERPL W/O P-5'-P-CCNC: 9 U/L
ANION GAP SERPL CALC-SCNC: 10 MMOL/L
AST SERPL-CCNC: 9 U/L
BASOPHILS # BLD AUTO: 0.03 K/UL
BASOPHILS NFR BLD: 0.2 %
BILIRUB SERPL-MCNC: 0.2 MG/DL
BUN SERPL-MCNC: 16 MG/DL
CALCIUM SERPL-MCNC: 9.3 MG/DL
CHLORIDE SERPL-SCNC: 104 MMOL/L
CO2 SERPL-SCNC: 27 MMOL/L
CREAT SERPL-MCNC: 0.7 MG/DL
DEPRECATED S PYO AG THROAT QL EIA: NEGATIVE
DIFFERENTIAL METHOD: ABNORMAL
EOSINOPHIL # BLD AUTO: 0.1 K/UL
EOSINOPHIL NFR BLD: 0.6 %
ERYTHROCYTE [DISTWIDTH] IN BLOOD BY AUTOMATED COUNT: 16.5 %
EST. GFR  (AFRICAN AMERICAN): >60 ML/MIN/1.73 M^2
EST. GFR  (NON AFRICAN AMERICAN): >60 ML/MIN/1.73 M^2
FLUAV AG SPEC QL IA: NEGATIVE
FLUBV AG SPEC QL IA: NEGATIVE
GLUCOSE SERPL-MCNC: 116 MG/DL
HCT VFR BLD AUTO: 39.1 %
HETEROPH AB SERPL QL IA: NEGATIVE
HGB BLD-MCNC: 12.7 G/DL
LYMPHOCYTES # BLD AUTO: 1.8 K/UL
LYMPHOCYTES NFR BLD: 14.7 %
MCH RBC QN AUTO: 24.1 PG
MCHC RBC AUTO-ENTMCNC: 32.5 G/DL
MCV RBC AUTO: 74 FL
MONOCYTES # BLD AUTO: 0.8 K/UL
MONOCYTES NFR BLD: 6.5 %
NEUTROPHILS # BLD AUTO: 9.6 K/UL
NEUTROPHILS NFR BLD: 78 %
PLATELET # BLD AUTO: 256 K/UL
PMV BLD AUTO: 10.4 FL
POTASSIUM SERPL-SCNC: 3.8 MMOL/L
PROT SERPL-MCNC: 7 G/DL
RBC # BLD AUTO: 5.28 M/UL
SODIUM SERPL-SCNC: 141 MMOL/L
SPECIMEN SOURCE: NORMAL
WBC # BLD AUTO: 12.35 K/UL

## 2018-10-02 PROCEDURE — 87400 INFLUENZA A/B EACH AG IA: CPT

## 2018-10-02 PROCEDURE — 63600175 PHARM REV CODE 636 W HCPCS: Performed by: SURGERY

## 2018-10-02 PROCEDURE — 96372 THER/PROPH/DIAG INJ SC/IM: CPT

## 2018-10-02 PROCEDURE — 80053 COMPREHEN METABOLIC PANEL: CPT

## 2018-10-02 PROCEDURE — 87880 STREP A ASSAY W/OPTIC: CPT

## 2018-10-02 PROCEDURE — 36415 COLL VENOUS BLD VENIPUNCTURE: CPT

## 2018-10-02 PROCEDURE — 87081 CULTURE SCREEN ONLY: CPT

## 2018-10-02 PROCEDURE — 85025 COMPLETE CBC W/AUTO DIFF WBC: CPT

## 2018-10-02 PROCEDURE — 99284 EMERGENCY DEPT VISIT MOD MDM: CPT | Mod: 25

## 2018-10-02 PROCEDURE — 86308 HETEROPHILE ANTIBODY SCREEN: CPT

## 2018-10-02 RX ORDER — AZITHROMYCIN 250 MG/1
TABLET, FILM COATED ORAL
Qty: 6 TABLET | Refills: 0 | Status: SHIPPED | OUTPATIENT
Start: 2018-10-02

## 2018-10-02 RX ORDER — METHYLPREDNISOLONE SOD SUCC 125 MG
125 VIAL (EA) INJECTION
Status: COMPLETED | OUTPATIENT
Start: 2018-10-02 | End: 2018-10-02

## 2018-10-02 RX ORDER — METHYLPREDNISOLONE 4 MG/1
TABLET ORAL
Qty: 1 PACKAGE | Refills: 0 | Status: SHIPPED | OUTPATIENT
Start: 2018-10-02

## 2018-10-02 RX ADMIN — METHYLPREDNISOLONE SODIUM SUCCINATE 125 MG: 125 INJECTION, POWDER, FOR SOLUTION INTRAMUSCULAR; INTRAVENOUS at 09:10

## 2018-10-03 NOTE — ED PROVIDER NOTES
[facility]                                                 Chief Complaint  23 y.o. female with Sore Throat     History of Present Illness  Rosaura Boyd presents to the emergency room with sore throat for last 2 weeks  Patient states she has had on again off again sore throat the last 2 weeks now  Patient was seen in the emergency room started on Augmentin and steroids  Patient states she is not any better, mild or pharyngitis noted the ER today  No tonsillitis or exudate, no signs of peritonsillar abscess on ER evaluation  Normal swallowing, no drooling, no stridor, normal phonation on ER interview  Only complaint is sore throat, afebrile with good stable vital signs this evening    The history is provided by the patient   device was not used during this ER visit  Medical history:  Asthma, depression, kidney and ureter disorder, vaginal delivery  Surgeries: Tubal ligation   Allergies: Iodine and latex    Review of Systems and Physical Exam      Review of Systems  -- Constitution - no fever, denies fatigue, no weakness, no chills  -- Eyes - no tearing or redness, no visual disturbance  -- Ear, Nose - no tinnitus or earache, no nasal congestion or discharge  -- Mouth,Throat - sore throat, no toothache, normal voice, normal swallowing  -- Respiratory - denies cough and congestion, no shortness of breath, no DELGADO  -- Cardiovascular - denies chest pain, no palpitations, denies claudication  -- Gastrointestinal - denies abdominal pain, nausea, vomiting, or diarrhea  -- Genitourinary - no dysuria, no hematuria, no flank pain, no bladder pain  -- Musculoskeletal - denies back pain, negative for myalgias and arthralgias   -- Neurological - no headache, denies weakness or seizure; no LOC  -- Skin - denies pallor, rash, or changes in skin. no hives or welts noted    Vital Signs  Her oral temperature is 99.6 °F (37.6 °C).   Her blood pressure is 125/82 and her pulse is 91.   Her  respiration is 16 and oxygen saturation is 99%.     Physical Exam  -- Nursing note and vitals reviewed  -- Constitutional: Appears well-developed and well-nourished  -- Head: Atraumatic. Normocephalic. No obvious abnormality  -- Eyes: Pupils are equal and reactive to light. Normal conjunctiva and lids  -- Nose: Nose normal in appearance, nares grossly normal. No discharge  -- Throat: mild posterior oropharnyx erythema with no exudate   -- Ears: External ears and TM normal bilaterally. Normal hearing and no drainage  -- Neck: Normal range of motion. Neck supple. No masses, trachea midline  -- Cardiac: Normal rate, regular rhythm and normal heart sounds  -- Pulmonary: Normal respiratory effort, breath sounds clear to auscultation  -- Abdominal: Soft, no tenderness. Normal bowel sounds. Normal liver edge  -- Musculoskeletal: Normal range of motion, no effusions. Joints stable   -- Neurological: No focal deficits. Showed good interaction with staff  -- Skin: Warm and dry. No evidence of rash or cellulitis    Emergency Room Course      Treatment and Evaluation  -- The electrolytes drawn in the ER today were within normal limits  -- The CBC drawn in the ER today was within normal limits  -- The influenza screen was negative  -- The strep screen was negative  -- The mono screen was negative  --  mg Solumedrol given today in the ER    Diagnosis  -- The encounter diagnosis was Pharyngitis, unspecified etiology.    Disposition and Plan  -- Disposition: home  -- Condition: stable  -- Follow-up: Patient to follow up with [facility] in 1-2 days.  -- I advised the patient that we have found no life threatening condition today  -- At this time, I believe the patient is clinically stable for discharge.   -- The patient acknowledges that close follow up with a MD is required   -- Patient agrees to comply with all instruction and direction given in the ER    This note is dictated on Dragon Natural  Speaking word recognition program.  There are word recognition mistakes that are occasionally missed on review.          Piter Rodriguez MD  10/02/18 2059

## 2018-10-03 NOTE — ED TRIAGE NOTES
"23 y.o. female presents to ER ED 01/ED 01A   Chief Complaint   Patient presents with    Sore Throat     with ear pain, head pain, eye pain, and "I can't swallow anything"   . No acute distress noted.  Patient completed course of antibiotics and steroids and cannot get ENT follow up soon. Patient is also breastfeeding.  "

## 2018-10-03 NOTE — ED NOTES
Throat culture obtained from the patient and sent to the lab for testing. Pt tolerated the procedure without incident.  Flu swab obtained from bilateral nares. Pt tolerated procedure without incident.

## 2018-10-05 LAB — BACTERIA THROAT CULT: NORMAL

## 2020-11-16 ENCOUNTER — HOSPITAL ENCOUNTER (EMERGENCY)
Facility: HOSPITAL | Age: 25
Discharge: HOME | End: 2020-11-16
Attending: EMERGENCY MEDICINE
Payer: MEDICAID

## 2020-11-16 VITALS
RESPIRATION RATE: 18 BRPM | WEIGHT: 135 LBS | BODY MASS INDEX: 23.92 KG/M2 | TEMPERATURE: 98 F | DIASTOLIC BLOOD PRESSURE: 74 MMHG | HEIGHT: 63 IN | HEART RATE: 89 BPM | SYSTOLIC BLOOD PRESSURE: 126 MMHG | OXYGEN SATURATION: 99 %

## 2020-11-16 DIAGNOSIS — R11.0 NAUSEA: ICD-10-CM

## 2020-11-16 DIAGNOSIS — R19.7 DIARRHEA, UNSPECIFIED TYPE: ICD-10-CM

## 2020-11-16 DIAGNOSIS — N39.0 URINARY TRACT INFECTION WITHOUT HEMATURIA, SITE UNSPECIFIED: Primary | ICD-10-CM

## 2020-11-16 DIAGNOSIS — R42 VERTIGO: ICD-10-CM

## 2020-11-16 LAB
ALBUMIN SERPL-MCNC: 3.6 G/DL (ref 3.3–5.5)
ALP SERPL-CCNC: 56 U/L (ref 42–141)
B-HCG UR QL: NEGATIVE
BILIRUB SERPL-MCNC: 0.7 MG/DL (ref 0.2–1.6)
BILIRUBIN, POC UA: NEGATIVE
BLOOD, POC UA: ABNORMAL
BUN SERPL-MCNC: 14 MG/DL (ref 7–22)
CALCIUM SERPL-MCNC: 9.3 MG/DL (ref 8–10.3)
CHLORIDE SERPL-SCNC: 106 MMOL/L (ref 98–108)
CLARITY, POC UA: CLEAR
COLOR, POC UA: YELLOW
CREAT SERPL-MCNC: 0.5 MG/DL (ref 0.6–1.2)
CTP QC/QA: YES
GLUCOSE SERPL-MCNC: 87 MG/DL (ref 73–118)
GLUCOSE, POC UA: NEGATIVE
KETONES, POC UA: NEGATIVE
LEUKOCYTE EST, POC UA: ABNORMAL
NITRITE, POC UA: POSITIVE
PH UR STRIP: 6 [PH]
POC ALT (SGPT): 14 U/L (ref 10–47)
POC AST (SGOT): 17 U/L (ref 11–38)
POC TCO2: 31 MMOL/L (ref 18–33)
POTASSIUM BLD-SCNC: 3.7 MMOL/L (ref 3.6–5.1)
PROTEIN, POC UA: ABNORMAL
PROTEIN, POC: 6.4 G/DL (ref 6.4–8.1)
SODIUM BLD-SCNC: 141 MMOL/L (ref 128–145)
SPECIFIC GRAVITY, POC UA: 1.02
UROBILINOGEN, POC UA: 0.2 E.U./DL

## 2020-11-16 PROCEDURE — 87186 SC STD MICRODIL/AGAR DIL: CPT

## 2020-11-16 PROCEDURE — 81003 URINALYSIS AUTO W/O SCOPE: CPT | Mod: ER

## 2020-11-16 PROCEDURE — 63600175 PHARM REV CODE 636 W HCPCS: Mod: ER | Performed by: NURSE PRACTITIONER

## 2020-11-16 PROCEDURE — 85025 COMPLETE CBC W/AUTO DIFF WBC: CPT | Mod: ER

## 2020-11-16 PROCEDURE — 87077 CULTURE AEROBIC IDENTIFY: CPT

## 2020-11-16 PROCEDURE — 87088 URINE BACTERIA CULTURE: CPT

## 2020-11-16 PROCEDURE — 25000003 PHARM REV CODE 250: Mod: ER | Performed by: NURSE PRACTITIONER

## 2020-11-16 PROCEDURE — 80053 COMPREHEN METABOLIC PANEL: CPT | Mod: ER

## 2020-11-16 PROCEDURE — 96361 HYDRATE IV INFUSION ADD-ON: CPT | Mod: ER

## 2020-11-16 PROCEDURE — 87086 URINE CULTURE/COLONY COUNT: CPT

## 2020-11-16 PROCEDURE — 81025 URINE PREGNANCY TEST: CPT | Mod: ER | Performed by: EMERGENCY MEDICINE

## 2020-11-16 PROCEDURE — 99284 EMERGENCY DEPT VISIT MOD MDM: CPT | Mod: 25,ER

## 2020-11-16 PROCEDURE — 96374 THER/PROPH/DIAG INJ IV PUSH: CPT | Mod: ER

## 2020-11-16 RX ORDER — ONDANSETRON 2 MG/ML
4 INJECTION INTRAMUSCULAR; INTRAVENOUS
Status: COMPLETED | OUTPATIENT
Start: 2020-11-16 | End: 2020-11-16

## 2020-11-16 RX ORDER — MECLIZINE HYDROCHLORIDE 25 MG/1
50 TABLET ORAL
Status: COMPLETED | OUTPATIENT
Start: 2020-11-16 | End: 2020-11-16

## 2020-11-16 RX ORDER — ONDANSETRON 4 MG/1
4 TABLET, ORALLY DISINTEGRATING ORAL EVERY 8 HOURS PRN
Qty: 20 TABLET | Refills: 0 | Status: SHIPPED | OUTPATIENT
Start: 2020-11-16

## 2020-11-16 RX ORDER — MECLIZINE HYDROCHLORIDE 25 MG/1
25 TABLET ORAL 3 TIMES DAILY PRN
Qty: 20 TABLET | Refills: 0 | Status: SHIPPED | OUTPATIENT
Start: 2020-11-16

## 2020-11-16 RX ORDER — NITROFURANTOIN 25; 75 MG/1; MG/1
100 CAPSULE ORAL
Status: COMPLETED | OUTPATIENT
Start: 2020-11-16 | End: 2020-11-16

## 2020-11-16 RX ORDER — SODIUM CHLORIDE 9 MG/ML
1000 INJECTION, SOLUTION INTRAVENOUS
Status: COMPLETED | OUTPATIENT
Start: 2020-11-16 | End: 2020-11-16

## 2020-11-16 RX ORDER — NITROFURANTOIN 25; 75 MG/1; MG/1
100 CAPSULE ORAL 2 TIMES DAILY
Qty: 14 CAPSULE | Refills: 0 | Status: SHIPPED | OUTPATIENT
Start: 2020-11-16 | End: 2020-11-23

## 2020-11-16 RX ADMIN — MECLIZINE HYDROCHLORIDE 50 MG: 25 TABLET ORAL at 04:11

## 2020-11-16 RX ADMIN — ONDANSETRON 4 MG: 2 INJECTION INTRAMUSCULAR; INTRAVENOUS at 03:11

## 2020-11-16 RX ADMIN — SODIUM CHLORIDE 1000 ML: 0.9 INJECTION, SOLUTION INTRAVENOUS at 03:11

## 2020-11-16 RX ADMIN — NITROFURANTOIN (MONOHYDRATE/MACROCRYSTALS) 100 MG: 75; 25 CAPSULE ORAL at 04:11

## 2020-11-16 NOTE — ED PROVIDER NOTES
Encounter Date: 11/16/2020    SCRIBE #1 NOTE: I, Kaleighaleksandr Dominguez, am scribing for, and in the presence of,  JEANNETTE Rose. I have scribed the following portions of the note - Other sections scribed: HPI, ROS, PE.       History     Chief Complaint   Patient presents with    Dizziness     Pt reports dizziness, fatigue and kidney pain since late October. Pt denies fevers but has chills.     This is a nontoxic appearing 25 y.o. female who presents to the ED for evaluation of dizziness and fatigue for 3 weeks, worsening today. Patient endorses nausea and diarrhea, but denies vomiting. She has had 2-3 episodes of diarrhea daily for the the past two day. She states she feels lightheaded constantly. LMP 10/31/20. She states she drinks water regularly. Patient reports a Hx of bladder infection. Reports a Hx of tubal ligation.    The history is provided by the patient. No  was used.   Dizziness  This is a new problem. The current episode started more than 1 week ago. The problem has not changed since onset.Pertinent negatives include no chest pain, no abdominal pain and no shortness of breath. Nothing aggravates the symptoms. Nothing relieves the symptoms.     Review of patient's allergies indicates:   Allergen Reactions    Iodine and iodide containing products Hives    Latex, natural rubber Rash     Past Medical History:   Diagnosis Date    Asthma     Depression     Pt has Hx of depression and self mutilation (cutting)    Disorder of kidney and ureter     Vaginal delivery     x4     Past Surgical History:   Procedure Laterality Date    TUBAL LIGATION       Family History   Problem Relation Age of Onset    Lupus Mother      Social History     Tobacco Use    Smoking status: Former Smoker     Types: Cigarettes    Smokeless tobacco: Never Used   Substance Use Topics    Alcohol use: No     Comment: not currently. socially    Drug use: No     Review of Systems   Constitutional: Positive for  fatigue. Negative for chills and fever.   HENT: Negative.  Negative for congestion.    Eyes: Negative.    Respiratory: Negative.  Negative for shortness of breath.    Cardiovascular: Negative.  Negative for chest pain.   Gastrointestinal: Positive for diarrhea and vomiting. Negative for abdominal pain and nausea.   Endocrine: Negative.    Genitourinary: Negative.  Negative for dysuria.   Musculoskeletal: Negative.    Skin: Negative.    Allergic/Immunologic: Negative.    Neurological: Positive for dizziness and light-headedness.   Hematological: Negative.    Psychiatric/Behavioral: Negative.    All other systems reviewed and are negative.      Physical Exam     Initial Vitals [11/16/20 1441]   BP Pulse Resp Temp SpO2   119/76 77 18 98.3 °F (36.8 °C) 100 %      MAP       --         Physical Exam    Nursing note and vitals reviewed.  Constitutional: She appears well-developed.   HENT:   Right Ear: External ear normal.   Left Ear: External ear normal.   Nose: Nose normal.   Mouth/Throat: Oropharynx is clear and moist.   Eyes: Conjunctivae and EOM are normal. Pupils are equal, round, and reactive to light.   Neck: Normal range of motion. Neck supple.   Cardiovascular: Normal rate, regular rhythm, S1 normal, S2 normal and normal heart sounds. Exam reveals no gallop and no friction rub.    No murmur heard.  Pulmonary/Chest: Breath sounds normal. No respiratory distress. She has no wheezes. She has no rhonchi. She has no rales.   Abdominal: Soft. Bowel sounds are normal. There is no abdominal tenderness. There is no guarding.   Musculoskeletal: Normal range of motion.   Neurological: She is alert and oriented to person, place, and time.   CN II- XII intact    Skin: Skin is warm and dry.   Psychiatric: She has a normal mood and affect. Her behavior is normal.         ED Course   Procedures  Labs Reviewed   POCT URINALYSIS W/O SCOPE - Abnormal; Notable for the following components:       Result Value    Blood, UA 1+ (*)      Protein, UA Trace (*)     Nitrite, UA Positive (*)     Leukocytes, UA Trace (*)     All other components within normal limits   POCT CMP - Abnormal; Notable for the following components:    POC Creatinine 0.5 (*)     All other components within normal limits   CULTURE, URINE   POCT URINE PREGNANCY   POCT URINALYSIS W/O SCOPE   POCT CBC   POCT CMP              Imaging Results    None          Medical Decision Making:   History:   Old Medical Records: I decided to obtain old medical records.  Initial Assessment:   This is a nontoxic appearing 25 y.o. female who presents to the ED for evaluation of dizziness and fatigue for 3 weeks, worsening today. Patient endorses nausea and diarrhea, but denies vomiting. She states she feels lightheaded constantly. LMP 10/31/20. She states she drinks water regularly. Patient reports a Hx of bladder infection. Reports a Hx of tubal ligation.  Differential Diagnosis:   Dehydration, UTI, pyelonephritis, early pregnancy. Vertigo  Clinical Tests:   Lab Tests: Ordered and Reviewed  The following lab test(s) were unremarkable: UPT  ED Management:  Physical exam performed.    Orthostatic signs normal. Pt was given 1 liter NS. Medicated with meclizine 50 mg po. Dizziness resolved.   UPT negative.  Urinalysis positive for nitrites and leukocytes.  Urine culture pending.  Medicated with Macrobid in ED. Discharge with Macrobid, Zofran and Motrin.  Follow-up with PCP in 2 days.  Return ED for worsening of symptoms.            Scribe Attestation:   Scribe #1: I performed the above scribed service and the documentation accurately describes the services I performed. I attest to the accuracy of the note.    This document was produced by a scribe under my direction and in my presence. I agree with the content of the note and have made any necessary edits.     JEANNETTE Rose    11/16/2020 3:56 PM                  Clinical Impression:     ICD-10-CM ICD-9-CM   1. Urinary tract infection without  hematuria, site unspecified  N39.0 599.0   2. Nausea  R11.0 787.02   3. Diarrhea, unspecified type  R19.7 787.91                          ED Disposition Condition    Discharge Stable        ED Prescriptions     Medication Sig Dispense Start Date End Date Auth. Provider    nitrofurantoin, macrocrystal-monohydrate, (MACROBID) 100 MG capsule Take 1 capsule (100 mg total) by mouth 2 (two) times daily. for 7 days 14 capsule 11/16/2020 11/23/2020 JEANNETTE Morataya    ondansetron (ZOFRAN-ODT) 4 MG TbDL Take 1 tablet (4 mg total) by mouth every 8 (eight) hours as needed (nausea). 20 tablet 11/16/2020  JEANNETTE Morataya        Follow-up Information    None                                      JEANNETTE Morataya  11/16/20 8801       JEANNETTE Morataya  11/16/20 1601

## 2020-11-16 NOTE — Clinical Note
"Rosaura Boyd (Nicole) was seen and treated in our emergency department on 11/16/2020.  She may return to work on 11/19/2020.       If you have any questions or concerns, please don't hesitate to call.      JEANNETTE Morataya"

## 2020-11-18 LAB — BACTERIA UR CULT: ABNORMAL

## 2021-05-10 ENCOUNTER — PATIENT MESSAGE (OUTPATIENT)
Dept: RESEARCH | Facility: HOSPITAL | Age: 26
End: 2021-05-10

## 2021-11-11 ENCOUNTER — HOSPITAL ENCOUNTER (EMERGENCY)
Facility: HOSPITAL | Age: 26
Discharge: HOME | End: 2021-11-12
Attending: EMERGENCY MEDICINE
Payer: MEDICAID

## 2021-11-11 DIAGNOSIS — N93.9 VAGINAL BLEEDING: ICD-10-CM

## 2021-11-11 DIAGNOSIS — R10.13 EPIGASTRIC PAIN: Primary | ICD-10-CM

## 2021-11-11 LAB
ALBUMIN SERPL BCP-MCNC: 3.7 G/DL (ref 3.5–5.2)
ALP SERPL-CCNC: 45 U/L (ref 55–135)
ALT SERPL W/O P-5'-P-CCNC: 7 U/L (ref 10–44)
ANION GAP SERPL CALC-SCNC: 8 MMOL/L (ref 8–16)
AST SERPL-CCNC: 10 U/L (ref 10–40)
B-HCG UR QL: NEGATIVE
BACTERIA GENITAL QL WET PREP: NORMAL
BASOPHILS # BLD AUTO: 0.05 K/UL (ref 0–0.2)
BASOPHILS NFR BLD: 0.6 % (ref 0–1.9)
BILIRUB DIRECT SERPL-MCNC: 0.1 MG/DL (ref 0.1–0.3)
BILIRUB SERPL-MCNC: 0.3 MG/DL (ref 0.1–1)
BILIRUB UR QL STRIP: NEGATIVE
BUN SERPL-MCNC: 9 MG/DL (ref 6–20)
CALCIUM SERPL-MCNC: 8.9 MG/DL (ref 8.7–10.5)
CHLORIDE SERPL-SCNC: 108 MMOL/L (ref 95–110)
CLARITY UR REFRACT.AUTO: CLEAR
CLUE CELLS VAG QL WET PREP: NORMAL
CO2 SERPL-SCNC: 25 MMOL/L (ref 23–29)
COLOR UR AUTO: YELLOW
CREAT SERPL-MCNC: 0.7 MG/DL (ref 0.5–1.4)
CTP QC/QA: YES
DIFFERENTIAL METHOD: NORMAL
EOSINOPHIL # BLD AUTO: 0.3 K/UL (ref 0–0.5)
EOSINOPHIL NFR BLD: 4.2 % (ref 0–8)
ERYTHROCYTE [DISTWIDTH] IN BLOOD BY AUTOMATED COUNT: 12.1 % (ref 11.5–14.5)
EST. GFR  (AFRICAN AMERICAN): >60 ML/MIN/1.73 M^2
EST. GFR  (NON AFRICAN AMERICAN): >60 ML/MIN/1.73 M^2
FILAMENT FUNGI VAG WET PREP-#/AREA: NORMAL
GLUCOSE SERPL-MCNC: 77 MG/DL (ref 70–110)
GLUCOSE UR QL STRIP: NEGATIVE
HCT VFR BLD AUTO: 37.5 % (ref 37–48.5)
HGB BLD-MCNC: 12.3 G/DL (ref 12–16)
HGB UR QL STRIP: NEGATIVE
IMM GRANULOCYTES # BLD AUTO: 0.02 K/UL (ref 0–0.04)
IMM GRANULOCYTES NFR BLD AUTO: 0.3 % (ref 0–0.5)
KETONES UR QL STRIP: NEGATIVE
LEUKOCYTE ESTERASE UR QL STRIP: NEGATIVE
LIPASE SERPL-CCNC: 22 U/L (ref 4–60)
LYMPHOCYTES # BLD AUTO: 2.8 K/UL (ref 1–4.8)
LYMPHOCYTES NFR BLD: 35.6 % (ref 18–48)
MCH RBC QN AUTO: 28.9 PG (ref 27–31)
MCHC RBC AUTO-ENTMCNC: 32.8 G/DL (ref 32–36)
MCV RBC AUTO: 88 FL (ref 82–98)
MONOCYTES # BLD AUTO: 0.5 K/UL (ref 0.3–1)
MONOCYTES NFR BLD: 6.9 % (ref 4–15)
NEUTROPHILS # BLD AUTO: 4.1 K/UL (ref 1.8–7.7)
NEUTROPHILS NFR BLD: 52.4 % (ref 38–73)
NITRITE UR QL STRIP: NEGATIVE
NRBC BLD-RTO: 0 /100 WBC
PH UR STRIP: 6 [PH] (ref 5–8)
PLATELET # BLD AUTO: 221 K/UL (ref 150–450)
PMV BLD AUTO: 10.6 FL (ref 9.2–12.9)
POTASSIUM SERPL-SCNC: 3.9 MMOL/L (ref 3.5–5.1)
PROT SERPL-MCNC: 6.5 G/DL (ref 6–8.4)
PROT UR QL STRIP: NEGATIVE
RBC # BLD AUTO: 4.26 M/UL (ref 4–5.4)
SODIUM SERPL-SCNC: 141 MMOL/L (ref 136–145)
SP GR UR STRIP: 1.02 (ref 1–1.03)
SPECIMEN SOURCE: NORMAL
T VAGINALIS GENITAL QL WET PREP: NORMAL
URN SPEC COLLECT METH UR: NORMAL
WBC # BLD AUTO: 7.87 K/UL (ref 3.9–12.7)
WBC #/AREA VAG WET PREP: NORMAL
YEAST GENITAL QL WET PREP: NORMAL

## 2021-11-11 PROCEDURE — 87591 N.GONORRHOEAE DNA AMP PROB: CPT | Performed by: EMERGENCY MEDICINE

## 2021-11-11 PROCEDURE — 99285 EMERGENCY DEPT VISIT HI MDM: CPT | Mod: 25

## 2021-11-11 PROCEDURE — 63600175 PHARM REV CODE 636 W HCPCS: Performed by: EMERGENCY MEDICINE

## 2021-11-11 PROCEDURE — 99284 PR EMERGENCY DEPT VISIT,LEVEL IV: ICD-10-PCS | Mod: ,,, | Performed by: EMERGENCY MEDICINE

## 2021-11-11 PROCEDURE — 25000003 PHARM REV CODE 250: Performed by: EMERGENCY MEDICINE

## 2021-11-11 PROCEDURE — 85025 COMPLETE CBC W/AUTO DIFF WBC: CPT | Performed by: EMERGENCY MEDICINE

## 2021-11-11 PROCEDURE — 86803 HEPATITIS C AB TEST: CPT | Performed by: EMERGENCY MEDICINE

## 2021-11-11 PROCEDURE — 80076 HEPATIC FUNCTION PANEL: CPT | Performed by: EMERGENCY MEDICINE

## 2021-11-11 PROCEDURE — 87210 SMEAR WET MOUNT SALINE/INK: CPT | Performed by: EMERGENCY MEDICINE

## 2021-11-11 PROCEDURE — 99284 EMERGENCY DEPT VISIT MOD MDM: CPT | Mod: ,,, | Performed by: EMERGENCY MEDICINE

## 2021-11-11 PROCEDURE — 87389 HIV-1 AG W/HIV-1&-2 AB AG IA: CPT | Performed by: EMERGENCY MEDICINE

## 2021-11-11 PROCEDURE — 87491 CHLMYD TRACH DNA AMP PROBE: CPT | Performed by: EMERGENCY MEDICINE

## 2021-11-11 PROCEDURE — 83690 ASSAY OF LIPASE: CPT | Performed by: EMERGENCY MEDICINE

## 2021-11-11 PROCEDURE — 96374 THER/PROPH/DIAG INJ IV PUSH: CPT

## 2021-11-11 PROCEDURE — 81025 URINE PREGNANCY TEST: CPT | Performed by: EMERGENCY MEDICINE

## 2021-11-11 PROCEDURE — 96375 TX/PRO/DX INJ NEW DRUG ADDON: CPT

## 2021-11-11 PROCEDURE — 81003 URINALYSIS AUTO W/O SCOPE: CPT | Performed by: EMERGENCY MEDICINE

## 2021-11-11 PROCEDURE — 80048 BASIC METABOLIC PNL TOTAL CA: CPT | Performed by: EMERGENCY MEDICINE

## 2021-11-11 RX ORDER — CITALOPRAM 10 MG/1
10 TABLET ORAL DAILY
COMMUNITY

## 2021-11-11 RX ORDER — DIPHENHYDRAMINE HYDROCHLORIDE 50 MG/ML
50 INJECTION INTRAMUSCULAR; INTRAVENOUS
Status: COMPLETED | OUTPATIENT
Start: 2021-11-11 | End: 2021-11-12

## 2021-11-11 RX ORDER — KETOROLAC TROMETHAMINE 30 MG/ML
10 INJECTION, SOLUTION INTRAMUSCULAR; INTRAVENOUS
Status: COMPLETED | OUTPATIENT
Start: 2021-11-11 | End: 2021-11-11

## 2021-11-11 RX ORDER — ACETAMINOPHEN 500 MG
1000 TABLET ORAL
Status: COMPLETED | OUTPATIENT
Start: 2021-11-11 | End: 2021-11-11

## 2021-11-11 RX ADMIN — KETOROLAC TROMETHAMINE 10 MG: 30 INJECTION, SOLUTION INTRAMUSCULAR at 10:11

## 2021-11-11 RX ADMIN — ACETAMINOPHEN 1000 MG: 500 TABLET ORAL at 10:11

## 2021-11-12 VITALS
SYSTOLIC BLOOD PRESSURE: 99 MMHG | HEART RATE: 60 BPM | RESPIRATION RATE: 16 BRPM | OXYGEN SATURATION: 98 % | DIASTOLIC BLOOD PRESSURE: 58 MMHG | TEMPERATURE: 98 F

## 2021-11-12 LAB
HCV AB SERPL QL IA: NEGATIVE
HIV 1+2 AB+HIV1 P24 AG SERPL QL IA: NEGATIVE

## 2021-11-12 PROCEDURE — 63600175 PHARM REV CODE 636 W HCPCS: Performed by: EMERGENCY MEDICINE

## 2021-11-12 PROCEDURE — 25500020 PHARM REV CODE 255: Performed by: EMERGENCY MEDICINE

## 2021-11-12 RX ADMIN — IOHEXOL 75 ML: 350 INJECTION, SOLUTION INTRAVENOUS at 12:11

## 2021-11-12 RX ADMIN — DIPHENHYDRAMINE HYDROCHLORIDE 50 MG: 50 INJECTION INTRAMUSCULAR; INTRAVENOUS at 12:11

## 2021-11-16 LAB
C TRACH DNA SPEC QL NAA+PROBE: NOT DETECTED
N GONORRHOEA DNA SPEC QL NAA+PROBE: NOT DETECTED

## 2022-08-02 ENCOUNTER — HOSPITAL ENCOUNTER (EMERGENCY)
Facility: HOSPITAL | Age: 27
Discharge: HOME | End: 2022-08-02
Attending: EMERGENCY MEDICINE
Payer: MEDICAID

## 2022-08-02 VITALS
HEART RATE: 60 BPM | DIASTOLIC BLOOD PRESSURE: 80 MMHG | BODY MASS INDEX: 23.04 KG/M2 | HEIGHT: 63 IN | SYSTOLIC BLOOD PRESSURE: 123 MMHG | TEMPERATURE: 98 F | WEIGHT: 130 LBS | RESPIRATION RATE: 16 BRPM | OXYGEN SATURATION: 100 %

## 2022-08-02 DIAGNOSIS — N93.9 VAGINAL BLEEDING: Primary | ICD-10-CM

## 2022-08-02 LAB
ANION GAP SERPL CALC-SCNC: 7 MMOL/L (ref 8–16)
B-HCG UR QL: NEGATIVE
BASOPHILS # BLD AUTO: 0.03 K/UL (ref 0–0.2)
BASOPHILS NFR BLD: 0.5 % (ref 0–1.9)
BILIRUB UR QL STRIP: NEGATIVE
BUN SERPL-MCNC: 8 MG/DL (ref 6–20)
CALCIUM SERPL-MCNC: 8.9 MG/DL (ref 8.7–10.5)
CHLORIDE SERPL-SCNC: 106 MMOL/L (ref 95–110)
CLARITY UR REFRACT.AUTO: CLEAR
CO2 SERPL-SCNC: 26 MMOL/L (ref 23–29)
COLOR UR AUTO: ABNORMAL
CREAT SERPL-MCNC: 0.6 MG/DL (ref 0.5–1.4)
CTP QC/QA: YES
DIFFERENTIAL METHOD: NORMAL
EOSINOPHIL # BLD AUTO: 0.2 K/UL (ref 0–0.5)
EOSINOPHIL NFR BLD: 2.6 % (ref 0–8)
ERYTHROCYTE [DISTWIDTH] IN BLOOD BY AUTOMATED COUNT: 11.9 % (ref 11.5–14.5)
EST. GFR  (NO RACE VARIABLE): >60 ML/MIN/1.73 M^2
GLUCOSE SERPL-MCNC: 88 MG/DL (ref 70–110)
GLUCOSE UR QL STRIP: NEGATIVE
HCT VFR BLD AUTO: 37.2 % (ref 37–48.5)
HGB BLD-MCNC: 12.6 G/DL (ref 12–16)
HGB UR QL STRIP: ABNORMAL
IMM GRANULOCYTES # BLD AUTO: 0.02 K/UL (ref 0–0.04)
IMM GRANULOCYTES NFR BLD AUTO: 0.3 % (ref 0–0.5)
KETONES UR QL STRIP: NEGATIVE
LEUKOCYTE ESTERASE UR QL STRIP: NEGATIVE
LYMPHOCYTES # BLD AUTO: 1.7 K/UL (ref 1–4.8)
LYMPHOCYTES NFR BLD: 25.6 % (ref 18–48)
MCH RBC QN AUTO: 29.5 PG (ref 27–31)
MCHC RBC AUTO-ENTMCNC: 33.9 G/DL (ref 32–36)
MCV RBC AUTO: 87 FL (ref 82–98)
MICROSCOPIC COMMENT: NORMAL
MONOCYTES # BLD AUTO: 0.4 K/UL (ref 0.3–1)
MONOCYTES NFR BLD: 6.1 % (ref 4–15)
NEUTROPHILS # BLD AUTO: 4.3 K/UL (ref 1.8–7.7)
NEUTROPHILS NFR BLD: 64.9 % (ref 38–73)
NITRITE UR QL STRIP: NEGATIVE
NRBC BLD-RTO: 0 /100 WBC
PH UR STRIP: 7 [PH] (ref 5–8)
PLATELET # BLD AUTO: 226 K/UL (ref 150–450)
PMV BLD AUTO: 10.5 FL (ref 9.2–12.9)
POTASSIUM SERPL-SCNC: 4.1 MMOL/L (ref 3.5–5.1)
PROT UR QL STRIP: NEGATIVE
RBC # BLD AUTO: 4.27 M/UL (ref 4–5.4)
RBC #/AREA URNS AUTO: 1 /HPF (ref 0–4)
SODIUM SERPL-SCNC: 139 MMOL/L (ref 136–145)
SP GR UR STRIP: 1 (ref 1–1.03)
SQUAMOUS #/AREA URNS AUTO: 1 /HPF
URN SPEC COLLECT METH UR: ABNORMAL
WBC # BLD AUTO: 6.61 K/UL (ref 3.9–12.7)
WBC #/AREA URNS AUTO: 1 /HPF (ref 0–5)

## 2022-08-02 PROCEDURE — 99284 EMERGENCY DEPT VISIT MOD MDM: CPT | Mod: ,,, | Performed by: EMERGENCY MEDICINE

## 2022-08-02 PROCEDURE — 81001 URINALYSIS AUTO W/SCOPE: CPT

## 2022-08-02 PROCEDURE — 81025 URINE PREGNANCY TEST: CPT

## 2022-08-02 PROCEDURE — 99284 PR EMERGENCY DEPT VISIT,LEVEL IV: ICD-10-PCS | Mod: ,,, | Performed by: EMERGENCY MEDICINE

## 2022-08-02 PROCEDURE — 80048 BASIC METABOLIC PNL TOTAL CA: CPT

## 2022-08-02 PROCEDURE — 85025 COMPLETE CBC W/AUTO DIFF WBC: CPT

## 2022-08-02 PROCEDURE — 99283 EMERGENCY DEPT VISIT LOW MDM: CPT

## 2022-08-02 RX ORDER — FLUOXETINE 10 MG/1
10 CAPSULE ORAL DAILY
COMMUNITY

## 2022-08-02 NOTE — ED TRIAGE NOTES
Rosaura Boyd, a 27 y.o. female presents to the ED w/ complaint of heavy period and severe cramping. Hx tubal ligation, miscarriage post tubal ligation.     Triage note:  Chief Complaint   Patient presents with    Abdominal Pain     Pt complains of abdominal pain and heavy bleeding since Friday. Period started Friday. Pt also endorses nausea, vomiting, and diarrhea.      Review of patient's allergies indicates:   Allergen Reactions    Iodine and iodide containing products Hives    Latex, natural rubber Rash     Past Medical History:   Diagnosis Date    Asthma     Depression     Pt has Hx of depression and self mutilation (cutting)    Disorder of kidney and ureter     Vaginal delivery     x4

## 2022-08-02 NOTE — ED PROVIDER NOTES
Encounter Date: 8/2/2022       History     Chief Complaint   Patient presents with    Abdominal Pain     Pt complains of abdominal pain and heavy bleeding since Friday. Period started Friday. Pt also endorses nausea, vomiting, and diarrhea.      Pt is a 27 year old female with hx of tubal ligation(2018) who presents for evaluation of heavy vaginal bleeding, which has persisted for 3 days. Last known menstrual period was approximately 1 week ago. States typical bleeding is 1-2 tampons per day. Current bleeding has required 10 tampons per day. She reports having a miscarriage 1 year ago after a tubal ligation. States symptoms are similar, but more severe than at that time. She notes an associated nausea, vomiting, bilateral lower abdominal pain. Pain is described as a cramping sensation. Denies any pain radiation. No fever, chills, dysuria, or vaginal discharge    The history is provided by the patient.     Review of patient's allergies indicates:   Allergen Reactions    Iodine and iodide containing products Hives    Latex, natural rubber Rash     Past Medical History:   Diagnosis Date    Asthma     Depression     Pt has Hx of depression and self mutilation (cutting)    Disorder of kidney and ureter     Vaginal delivery     x4     Past Surgical History:   Procedure Laterality Date    TUBAL LIGATION       Family History   Problem Relation Age of Onset    Lupus Mother      Social History     Tobacco Use    Smoking status: Current Every Day Smoker     Types: Cigarettes    Smokeless tobacco: Never Used   Substance Use Topics    Alcohol use: No     Comment: not currently. socially    Drug use: No     Review of Systems   Constitutional: Negative for chills and fever.   HENT: Negative for nosebleeds and sore throat.    Eyes: Negative for photophobia and visual disturbance.   Respiratory: Negative for cough and shortness of breath.    Cardiovascular: Negative for chest pain.   Gastrointestinal: Positive for  abdominal pain, nausea and vomiting.   Genitourinary: Positive for vaginal bleeding. Negative for dysuria, frequency, vaginal discharge and vaginal pain.   Musculoskeletal: Negative for neck pain.   Skin: Negative for rash and wound.   Neurological: Negative for weakness and numbness.       Physical Exam     Initial Vitals [08/02/22 0930]   BP Pulse Resp Temp SpO2   123/80 60 16 98.3 °F (36.8 °C) 100 %      MAP       --         Physical Exam    Nursing note and vitals reviewed.  Constitutional: She appears well-developed and well-nourished. No distress.   HENT:   Head: Normocephalic and atraumatic.   Eyes: EOM are normal. No scleral icterus.   Cardiovascular: Normal rate and regular rhythm.   No murmur heard.  Pulmonary/Chest: Breath sounds normal. No stridor. No respiratory distress.   Abdominal: Abdomen is soft. She exhibits no distension and no mass. There is no abdominal tenderness. There is no rebound and no guarding.   Genitourinary:    Pelvic exam was performed with patient supine.   There is no rash or lesion on the right labia. There is no rash or lesion on the left labia. Cervix exhibits no motion tenderness, no discharge and no friability. Right adnexum displays no tenderness. Left adnexum displays no tenderness.    Vaginal bleeding present.      No vaginal discharge or erythema.   There is bleeding in the vagina. No erythema in the vagina.   Musculoskeletal:         General: Normal range of motion.     Neurological: She is alert and oriented to person, place, and time.   Skin: Skin is warm and dry.         ED Course   Procedures  Labs Reviewed   BASIC METABOLIC PANEL - Abnormal; Notable for the following components:       Result Value    Anion Gap 7 (*)     All other components within normal limits   URINALYSIS, REFLEX TO URINE CULTURE - Abnormal; Notable for the following components:    Occult Blood UA 3+ (*)     All other components within normal limits    Narrative:     Specimen Source->Urine   CBC W/  AUTO DIFFERENTIAL   URINALYSIS MICROSCOPIC    Narrative:     Specimen Source->Urine   POCT URINE PREGNANCY          Imaging Results    None          Medications - No data to display  Medical Decision Making:   History:   Old Medical Records: I decided to obtain old medical records.  Initial Assessment:   Pt is a 27 year old female who presents for evaluation of heavy vaginal bleeding, which has persisted for 3 days.  Differential Diagnosis:   Miscarriage, ectopic pregnancy, UTI, PID, menstrual bleeding  Clinical Tests:   Lab Tests: Ordered and Reviewed  ED Management:  Pregnancy test negative. Pt hemodynamically stable. Pelvic exam demonstrating bleeding originating from the cervix, without evidence of bleeding masses. Concern bleeding related to abnormal timing of menstrual period. Pt stable to follow up with OB/GYN and to take OTC ibuprofen for pain management.                       Clinical Impression:   Final diagnoses:  [N93.9] Vaginal bleeding (Primary)          ED Disposition Condition    Discharge Stable        ED Prescriptions     None        Follow-up Information    None          Jr. Ezekiel Duffy MD  Resident  08/02/22 [id number]

## 2022-09-13 NOTE — ED NOTES
Allergy band applied to patient, latex band applied to patient.   
Assisted MD with pelvic exam; no swabs collected  
Patient being set up for pelvic exam  
Patient given cup for urine sample.   
None

## 2022-10-21 ENCOUNTER — OCCUPATIONAL HEALTH (OUTPATIENT)
Dept: URGENT CARE | Facility: CLINIC | Age: 27
End: 2022-10-21

## 2022-10-21 DIAGNOSIS — Z00.00 ENCOUNTER FOR PHYSICAL EXAMINATION: Primary | ICD-10-CM

## 2022-10-21 LAB
CTP QC/QA: YES
POC 10 PANEL DRUG SCREEN: ABNORMAL

## 2022-10-21 PROCEDURE — 80305 DRUG TEST PRSMV DIR OPT OBS: CPT | Mod: S$GLB,,, | Performed by: EMERGENCY MEDICINE

## 2022-10-21 PROCEDURE — 80305 POCT RAPID DRUG SCREEN 10 PANEL: ICD-10-PCS | Mod: S$GLB,,, | Performed by: EMERGENCY MEDICINE

## 2022-10-21 NOTE — PROGRESS NOTES
Rapid 10 is a Non-Negative - must send to lab - Company protocol is to Stop Phy - Pending Results. SH

## 2022-10-25 ENCOUNTER — OCCUPATIONAL HEALTH (OUTPATIENT)
Dept: URGENT CARE | Facility: CLINIC | Age: 27
End: 2022-10-25
Payer: MEDICAID

## 2022-10-25 DIAGNOSIS — Z00.00 ENCOUNTER FOR PHYSICAL EXAMINATION: Primary | ICD-10-CM

## 2022-10-25 LAB
CTP QC/QA: YES
POC 10 PANEL DRUG SCREEN: NEGATIVE

## 2022-10-25 PROCEDURE — 80305 DRUG TEST PRSMV DIR OPT OBS: CPT | Mod: S$GLB,,, | Performed by: EMERGENCY MEDICINE

## 2022-10-25 PROCEDURE — 80305 POCT RAPID DRUG SCREEN 10 PANEL: ICD-10-PCS | Mod: S$GLB,,, | Performed by: EMERGENCY MEDICINE

## 2024-06-16 ENCOUNTER — HOSPITAL ENCOUNTER (EMERGENCY)
Facility: HOSPITAL | Age: 29
Discharge: HOME | End: 2024-06-16
Attending: EMERGENCY MEDICINE
Payer: MEDICAID

## 2024-06-16 VITALS
DIASTOLIC BLOOD PRESSURE: 78 MMHG | HEIGHT: 63 IN | HEART RATE: 86 BPM | TEMPERATURE: 98 F | BODY MASS INDEX: 26.22 KG/M2 | OXYGEN SATURATION: 99 % | SYSTOLIC BLOOD PRESSURE: 115 MMHG | RESPIRATION RATE: 18 BRPM | WEIGHT: 148 LBS

## 2024-06-16 DIAGNOSIS — M54.31 SCIATICA OF RIGHT SIDE: Primary | ICD-10-CM

## 2024-06-16 LAB
B-HCG UR QL: NEGATIVE
CTP QC/QA: YES

## 2024-06-16 PROCEDURE — 25000003 PHARM REV CODE 250: Performed by: PHYSICIAN ASSISTANT

## 2024-06-16 PROCEDURE — 81025 URINE PREGNANCY TEST: CPT | Performed by: PHYSICIAN ASSISTANT

## 2024-06-16 PROCEDURE — 96372 THER/PROPH/DIAG INJ SC/IM: CPT | Performed by: PHYSICIAN ASSISTANT

## 2024-06-16 PROCEDURE — 99284 EMERGENCY DEPT VISIT MOD MDM: CPT | Mod: 25

## 2024-06-16 PROCEDURE — 63600175 PHARM REV CODE 636 W HCPCS: Performed by: PHYSICIAN ASSISTANT

## 2024-06-16 RX ORDER — LIDOCAINE 50 MG/G
1 PATCH TOPICAL
Status: DISCONTINUED | OUTPATIENT
Start: 2024-06-16 | End: 2024-06-16 | Stop reason: HOSPADM

## 2024-06-16 RX ORDER — KETOROLAC TROMETHAMINE 30 MG/ML
15 INJECTION, SOLUTION INTRAMUSCULAR; INTRAVENOUS
Status: COMPLETED | OUTPATIENT
Start: 2024-06-16 | End: 2024-06-16

## 2024-06-16 RX ORDER — METHOCARBAMOL 500 MG/1
500 TABLET, FILM COATED ORAL 3 TIMES DAILY
Qty: 15 TABLET | Refills: 0 | Status: SHIPPED | OUTPATIENT
Start: 2024-06-16 | End: 2024-06-21

## 2024-06-16 RX ORDER — LIDOCAINE 50 MG/G
1 PATCH TOPICAL DAILY
Qty: 15 PATCH | Refills: 0 | Status: SHIPPED | OUTPATIENT
Start: 2024-06-16 | End: 2024-07-01

## 2024-06-16 RX ORDER — NAPROXEN 500 MG/1
500 TABLET ORAL 2 TIMES DAILY WITH MEALS
Qty: 20 TABLET | Refills: 0 | Status: SHIPPED | OUTPATIENT
Start: 2024-06-16 | End: 2024-06-26

## 2024-06-16 RX ORDER — ACETAMINOPHEN 500 MG
1000 TABLET ORAL
Status: COMPLETED | OUTPATIENT
Start: 2024-06-16 | End: 2024-06-16

## 2024-06-16 RX ADMIN — LIDOCAINE 5% 1 PATCH: 700 PATCH TOPICAL at 08:06

## 2024-06-16 RX ADMIN — ACETAMINOPHEN 1000 MG: 500 TABLET ORAL at 08:06

## 2024-06-16 RX ADMIN — KETOROLAC TROMETHAMINE 15 MG: 30 INJECTION, SOLUTION INTRAMUSCULAR; INTRAVENOUS at 08:06

## 2024-06-17 NOTE — ED NOTES
"Pt reports right lower back pain that radiates "up her spine and down her right leg". Pt stated she took a muscle relaxer, with no relief.     Adult Physical Assessment  LOC: Rosaura Boyd, 29 y.o. female verified via two identifiers.  The patient is awake, alert, oriented and speaking appropriately at this time.  APPEARANCE: Patient resting comfortably and appears to be in no acute distress at this time. Patient is clean and well groomed, patient's clothing is properly fastened.  SKIN:The skin is warm and dry, color consistent with ethnicity, patient has normal skin turgor and moist mucus membranes, skin intact, no breakdown or brusing noted.  MUSCULOSKELETAL: Patient moving all extremities well, no obvious swelling or deformities noted.Right lower back pain that radiate up her spine and down her right leg.  RESPIRATORY: Airway is open and patent, respirations are spontaneous, patient has a normal effort and rate, no accessory muscle use noted.  CARDIAC: Patient has a normal rate and rhythm, no periphreal edema noted in any extremity, capillary refill < 3 seconds in all extremities  ABDOMEN: Soft and non tender to palpation, no abdominal distention noted.   NEUROLOGIC: Eyes open spontaneously, behavior appropriate to situation, follows commands, facial expression symmetrical, bilateral hand grasp equal and even, purposeful motor response noted, normal sensation in all extremities when touched with a finger.    "

## 2024-06-17 NOTE — ED PROVIDER NOTES
Encounter Date: 6/16/2024       History     Chief Complaint   Patient presents with    Back Pain     Lower right  back pain started yesterday, endorse N/V/D, denies dysuria     29-year-old female presents to the emergency department for right lower back pain.  Denies any recent trauma states it is sharp worse with movement radiates down to her right foot.  Denies any history of IVDU, saddle anesthesia, loss of bowel or bladder or fevers/chills.  Denies any dysuria, hematuria or history kidney stones.  Additionally she states she is 12 days late on her she was menstrual cycle.  Would like a pregnancy test.    The history is provided by the patient.     Review of patient's allergies indicates:   Allergen Reactions    Iodine and iodide containing products Hives    Latex, natural rubber Rash     Past Medical History:   Diagnosis Date    Asthma     Depression     Pt has Hx of depression and self mutilation (cutting)    Disorder of kidney and ureter     Vaginal delivery     x4     Past Surgical History:   Procedure Laterality Date    TUBAL LIGATION       Family History   Problem Relation Name Age of Onset    Lupus Mother       Social History     Tobacco Use    Smoking status: Every Day     Types: Cigarettes    Smokeless tobacco: Never   Substance Use Topics    Alcohol use: No     Comment: not currently. socially    Drug use: No     Review of Systems   Constitutional:  Negative for chills and fever.   HENT:  Negative for sore throat.    Respiratory:  Negative for cough and shortness of breath.    Cardiovascular:  Negative for chest pain.   Gastrointestinal:  Negative for abdominal pain.   Genitourinary:  Negative for difficulty urinating and dysuria.   Musculoskeletal:  Positive for back pain.   Neurological:  Negative for weakness.   Psychiatric/Behavioral:  Negative for confusion.        Physical Exam     Initial Vitals [06/16/24 1854]   BP Pulse Resp Temp SpO2   120/81 90 18 98.2 °F (36.8 °C) 97 %      MAP       --          Physical Exam    Nursing note and vitals reviewed.  Constitutional: She appears well-developed and well-nourished.   HENT:   Head: Normocephalic and atraumatic.   Eyes: Conjunctivae are normal.   Neck: Neck supple.   Normal range of motion.  Cardiovascular:  Normal rate.           Pulmonary/Chest: Breath sounds normal.   Abdominal: There is no abdominal tenderness.   Musculoskeletal:         General: Normal range of motion.      Cervical back: Normal range of motion and neck supple.      Comments: No midline C, T or L-spine tenderness.  There is right lower paraspinal tenderness on exam.  Negative straight leg test.     Neurological: She is alert and oriented to person, place, and time. She has normal strength. No cranial nerve deficit. GCS score is 15. GCS eye subscore is 4. GCS verbal subscore is 5. GCS motor subscore is 6.   Skin: Skin is warm and dry.         ED Course   Procedures  Labs Reviewed   POCT URINE PREGNANCY          Imaging Results    None          Medications   LIDOcaine 5 % patch 1 patch (has no administration in time range)   acetaminophen tablet 1,000 mg (has no administration in time range)   ketorolac injection 15 mg (has no administration in time range)     Medical Decision Making  29-year-old female presents emergency department for right lower back pain that radiates down her right leg.  She also requesting a pregnancy test states she was about 12 days late from her LMP.    Differential includes but not limited to pregnancy, sciatica, cauda equina, muscle strain     UPT negative.  No midline C, T or L-spine tenderness.  Denies any red flag symptoms and low suspicion for cauda equina.  No indication for emergent MRI at this time.  No recent trauma doubt fracture.  She was no bony tenderness and is tender in the right lower paraspinal region.  Symptoms are consistent with sciatica.  She was ambulatory in the ED. Will treat with NSAIDs, Robaxin, topical Lidoderm patches and discussed PCP  follow-up.  Return ED precautions given.    Amount and/or Complexity of Data Reviewed  Labs: ordered.    Risk  OTC drugs.  Prescription drug management.                                      Clinical Impression:  Final diagnoses:  [M54.31] Sciatica of right side (Primary)          ED Disposition Condition    Discharge Stable          ED Prescriptions       Medication Sig Dispense Start Date End Date Auth. Provider    naproxen (NAPROSYN) 500 MG tablet Take 1 tablet (500 mg total) by mouth 2 (two) times daily with meals. for 10 days 20 tablet 6/16/2024 6/26/2024 Adi Goodwin PA-C    methocarbamoL (ROBAXIN) 500 MG Tab Take 1 tablet (500 mg total) by mouth 3 (three) times daily. for 5 days 15 tablet 6/16/2024 6/21/2024 Adi Goodwin PA-C    LIDOcaine (LIDODERM) 5 % Place 1 patch onto the skin once daily. Remove & Discard patch within 12 hours or as directed by MD for 15 days 15 patch 6/16/2024 7/1/2024 Adi Goodwin PA-C          Follow-up Information       Follow up With Specialties Details Why Contact Info    Clinic, [facility]  Schedule an appointment as soon as possible for a visit   [address] [facility] [place] [address] [phone]               Adi Goodwin PA-C  06/16/24 2020

## 2024-06-17 NOTE — DISCHARGE INSTRUCTIONS
Your urine pregnancy test was negative.  Your symptoms are consistent with a pinched nerve in her lower back which is known sciatica.  I have prescribed you anti-inflammatories as well as a muscle relaxer and topical pain patches to help with your symptoms.  Please use the muscle relaxer with caution as it may make you drowsy.  Occasionally the pain patches are expensive even with insurance you may also use the over-the-counter version which are very similar.  Please rest stay hydrated and I recommend gentle stretching.  If you continue to have pain please follow-up with your primary care doctor.

## (undated) DEVICE — TROCAR ENDOPATH XCEL 5X100MM

## (undated) DEVICE — ADHESIVE DERMABOND ADVANCED

## (undated) DEVICE — UNDERGLOVES BIOGEL PI SIZE 8

## (undated) DEVICE — SEE MEDLINE ITEM 157117

## (undated) DEVICE — CHLORAPREP TNT2%SOL10.5ML TEAL

## (undated) DEVICE — SCISSOR CURVED ENDOPATH 5MM

## (undated) DEVICE — FORCEP BPLR ENDOSCOPIC 310MM

## (undated) DEVICE — KIT ANTIFOG

## (undated) DEVICE — PAD PREP 50/CA

## (undated) DEVICE — CATH SELF-CATH FEMALE 14FR 6IN

## (undated) DEVICE — TUBING INSUFFLATION 10

## (undated) DEVICE — SUPPORT ULNA NERVE PROTECTOR

## (undated) DEVICE — GLOVE BIOGEL PI MICRO SZ 7.5

## (undated) DEVICE — PACK LAPAROSCOPY/PELVISCOPY II

## (undated) DEVICE — SEE MEDLINE ITEM 154981

## (undated) DEVICE — DRESSING LEUKOPLAST FLEX 1X3IN

## (undated) DEVICE — SEE MEDLINE ITEM 156946

## (undated) DEVICE — ELECTRODE REM PLYHSV RETURN 9

## (undated) DEVICE — BLADE SURG CARBON STEEL SZ11